# Patient Record
Sex: FEMALE | Race: WHITE | NOT HISPANIC OR LATINO | Employment: OTHER | URBAN - METROPOLITAN AREA
[De-identification: names, ages, dates, MRNs, and addresses within clinical notes are randomized per-mention and may not be internally consistent; named-entity substitution may affect disease eponyms.]

---

## 2017-03-15 ENCOUNTER — TRANSCRIBE ORDERS (OUTPATIENT)
Dept: ADMINISTRATIVE | Facility: HOSPITAL | Age: 67
End: 2017-03-15

## 2017-03-15 DIAGNOSIS — Z12.31 VISIT FOR SCREENING MAMMOGRAM: Primary | ICD-10-CM

## 2017-03-20 ENCOUNTER — APPOINTMENT (OUTPATIENT)
Dept: MAMMOGRAPHY | Facility: HOSPITAL | Age: 67
End: 2017-03-20

## 2017-09-08 ENCOUNTER — HOSPITAL ENCOUNTER (OUTPATIENT)
Dept: MAMMOGRAPHY | Facility: HOSPITAL | Age: 67
Discharge: HOME | End: 2017-09-08
Admitting: OBSTETRICS & GYNECOLOGY

## 2017-09-08 DIAGNOSIS — Z12.31 VISIT FOR SCREENING MAMMOGRAM: ICD-10-CM

## 2017-09-08 PROCEDURE — G0202 SCR MAMMO BI INCL CAD: HCPCS | Performed by: RADIOLOGY

## 2017-09-08 PROCEDURE — 77063 BREAST TOMOSYNTHESIS BI: CPT

## 2017-09-08 PROCEDURE — G0202 SCR MAMMO BI INCL CAD: HCPCS

## 2017-09-08 PROCEDURE — 77063 BREAST TOMOSYNTHESIS BI: CPT | Performed by: RADIOLOGY

## 2018-05-03 RX ORDER — RIZATRIPTAN BENZOATE 10 MG/1
10 TABLET, ORALLY DISINTEGRATING ORAL ONCE AS NEEDED
COMMUNITY

## 2018-05-03 RX ORDER — CELECOXIB 200 MG/1
200 CAPSULE ORAL DAILY
COMMUNITY
End: 2022-02-14 | Stop reason: HOSPADM

## 2018-05-03 RX ORDER — ONDANSETRON 4 MG/1
4 TABLET, FILM COATED ORAL EVERY 8 HOURS PRN
Status: ON HOLD | COMMUNITY
End: 2020-08-11

## 2018-05-03 RX ORDER — DIPHENHYDRAMINE HCL 25 MG
25 TABLET ORAL EVERY 6 HOURS PRN
COMMUNITY
End: 2018-05-15 | Stop reason: HOSPADM

## 2018-05-03 RX ORDER — HYDROCODONE BITARTRATE AND ACETAMINOPHEN 10; 325 MG/1; MG/1
1 TABLET ORAL EVERY 6 HOURS PRN
Status: ON HOLD | COMMUNITY
End: 2022-02-14 | Stop reason: SDUPTHER

## 2018-05-03 RX ORDER — TOPIRAMATE 100 MG/1
100 TABLET, FILM COATED ORAL 2 TIMES DAILY
COMMUNITY

## 2018-05-03 RX ORDER — MONTELUKAST SODIUM 4 MG/1
1 TABLET, CHEWABLE ORAL 2 TIMES DAILY
Status: ON HOLD | COMMUNITY
End: 2020-08-11

## 2018-05-03 RX ORDER — LIDOCAINE 50 MG/G
1 PATCH TOPICAL DAILY PRN
COMMUNITY

## 2018-05-04 ENCOUNTER — OFFICE VISIT (OUTPATIENT)
Dept: NEUROSURGERY | Facility: CLINIC | Age: 68
End: 2018-05-04

## 2018-05-04 VITALS — WEIGHT: 143 LBS | BODY MASS INDEX: 22.98 KG/M2 | HEIGHT: 66 IN | TEMPERATURE: 98.3 F

## 2018-05-04 DIAGNOSIS — M54.2 NECK PAIN: Primary | ICD-10-CM

## 2018-05-04 DIAGNOSIS — Z98.1 HISTORY OF FUSION OF CERVICAL SPINE: ICD-10-CM

## 2018-05-04 DIAGNOSIS — M54.12 CERVICAL RADICULOPATHY: ICD-10-CM

## 2018-05-04 PROCEDURE — 99203 OFFICE O/P NEW LOW 30 MIN: CPT | Performed by: NEUROLOGICAL SURGERY

## 2018-05-04 RX ORDER — TRAZODONE HYDROCHLORIDE 50 MG/1
50 TABLET ORAL NIGHTLY
COMMUNITY
End: 2022-02-11 | Stop reason: DRUGHIGH

## 2018-05-04 RX ORDER — BACLOFEN 10 MG/1
10 TABLET ORAL 3 TIMES DAILY PRN
COMMUNITY
Start: 2018-03-27 | End: 2018-05-04

## 2018-05-04 NOTE — PROGRESS NOTES
Patient: Camila Mayberry  : 1950    Primary Care Provider: Maya Kay MD    Requesting Provider: As above      History    Chief Complaint: Left upper extremity pain with sensory alteration.    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  On 5/15/2009 she underwent ACDF at C6-7 to address her left upper extremity radiculopathy.  She also has a chronic regional pain syndrome that afflicts her left foot.  She is followed by Dr. Verduzco in the pain clinic.  She does have a [employer]tronic spinal cord stimulator in place that was placed about 5 years ago.  Over the last 6 months she developed increasing neck pain that extends down the left arm into the middle finger of the left hand.  She has some associated sensory alteration.  She's had increased headache.  Physical therapy has actually aggravated her symptoms.  She has no right upper extremity symptoms.  She denies bowel or bladder dysfunction.  She's better lying flat.  She is worse when using her arm.    Review of Systems   Constitutional: Positive for activity change, appetite change, chills and fatigue. Negative for diaphoresis, fever and unexpected weight change.   HENT: Positive for postnasal drip and sinus pressure. Negative for congestion, dental problem, drooling, ear discharge, ear pain, facial swelling, hearing loss, mouth sores, nosebleeds, rhinorrhea, sneezing, sore throat, tinnitus, trouble swallowing and voice change.    Eyes: Negative for photophobia, pain, discharge, redness, itching and visual disturbance.   Respiratory: Negative for apnea, cough, choking, chest tightness, shortness of breath, wheezing and stridor.    Cardiovascular: Negative for chest pain, palpitations and leg swelling.   Gastrointestinal: Positive for abdominal pain and nausea. Negative for abdominal distention, anal bleeding, blood in stool, constipation, diarrhea, rectal pain and vomiting.   Endocrine: Positive for  cold intolerance. Negative for heat intolerance, polydipsia, polyphagia and polyuria.   Genitourinary: Negative for decreased urine volume, difficulty urinating, dysuria, enuresis, flank pain, frequency, genital sores, hematuria and urgency.   Musculoskeletal: Positive for arthralgias, myalgias, neck pain and neck stiffness. Negative for back pain, gait problem and joint swelling.   Skin: Negative for color change, pallor, rash and wound.   Allergic/Immunologic: Negative for environmental allergies, food allergies and immunocompromised state.   Neurological: Positive for headaches. Negative for dizziness, tremors, seizures, syncope, facial asymmetry, speech difficulty, weakness, light-headedness and numbness.   Hematological: Negative for adenopathy. Does not bruise/bleed easily.   Psychiatric/Behavioral: Negative for agitation, behavioral problems, confusion, decreased concentration, dysphoric mood, hallucinations, self-injury, sleep disturbance and suicidal ideas. The patient is not nervous/anxious and is not hyperactive.        The patient's past medical history, past surgical history, family history, and social history have been reviewed at length in the electronic medical record.    Physical Exam:   There were no vitals taken for this visit.  CONSTITUTIONAL: Patient is well-nourished, pleasant and appears stated age.  CV: Heart regular rate and rhythm without murmur, rub, or gallop.  PULMONARY: Lungs are clear to ascultation.  MUSCULOSKELETAL:  Neck tenderness to palpation is not observed.   ROM in neck is normal.  Well-healed low right anterior cervical incision is noted.  Ranging the left shoulder induces some trapezius region pain.  Tinel signs are negative at the wrists.  NEUROLOGICAL:  Orientation, memory, attention span, language function, and cognition have been examined and are intact.  Strength is intact in the upper and lower extremities to direct testing.  Muscle tone is normal throughout.  Station  and gait are normal.  Sensation is intact to light touch testing throughout.  Deep tendon reflexes are 1+ and symmetrical.  Jessica's Sign is negative bilaterally. No clonus is elicited.  Coordination is intact.      Medical Decision Making      Diagnosis:   Cervical radiculopathy.    Treatment Options:   I suspect that her 5-year-old Medtronic epidural spinal cord stimulator is not MRI compatible.  I'm going to set up a cervical CT myelogram to better delineate her disease.      No diagnosis found.    Scribed for Stephon Arriola MD by Zuleyma Mendoza CMA on 05/04/2018 at 9:15 AM    I, Dr. Arriola, personally performed the services described in the documentation, as scribed in my presence, and it is both accurate and complete.

## 2018-05-08 ENCOUNTER — HOSPITAL ENCOUNTER (OUTPATIENT)
Dept: GENERAL RADIOLOGY | Facility: HOSPITAL | Age: 68
Discharge: HOME | End: 2018-05-08
Attending: NEUROLOGICAL SURGERY | Admitting: NEUROLOGICAL SURGERY

## 2018-05-08 ENCOUNTER — HOSPITAL ENCOUNTER (OUTPATIENT)
Dept: GENERAL RADIOLOGY | Facility: HOSPITAL | Age: 68
Discharge: HOME | End: 2018-05-08

## 2018-05-08 VITALS
SYSTOLIC BLOOD PRESSURE: 108 MMHG | WEIGHT: 143 LBS | OXYGEN SATURATION: 96 % | RESPIRATION RATE: 18 BRPM | TEMPERATURE: 98 F | BODY MASS INDEX: 22.98 KG/M2 | HEART RATE: 59 BPM | HEIGHT: 66 IN | DIASTOLIC BLOOD PRESSURE: 62 MMHG

## 2018-05-08 VITALS — HEART RATE: 60 BPM | SYSTOLIC BLOOD PRESSURE: 100 MMHG | RESPIRATION RATE: 18 BRPM | DIASTOLIC BLOOD PRESSURE: 56 MMHG

## 2018-05-08 DIAGNOSIS — M54.12 CERVICAL RADICULOPATHY: ICD-10-CM

## 2018-05-08 DIAGNOSIS — M54.2 NECK PAIN: Primary | ICD-10-CM

## 2018-05-08 PROCEDURE — 72240 MYELOGRAPHY NECK SPINE: CPT

## 2018-05-08 PROCEDURE — 25010000002 IOPAMIDOL 61 % SOLUTION: Performed by: NEUROLOGICAL SURGERY

## 2018-05-08 RX ORDER — DIPHENHYDRAMINE HCL 25 MG
50 CAPSULE ORAL TAKE AS DIRECTED
Qty: 2 CAPSULE | Refills: 0 | Status: SHIPPED | OUTPATIENT
Start: 2018-05-08 | End: 2018-05-15 | Stop reason: HOSPADM

## 2018-05-08 RX ORDER — PROMETHAZINE HYDROCHLORIDE 25 MG/ML
25 INJECTION, SOLUTION INTRAMUSCULAR; INTRAVENOUS ONCE AS NEEDED
Status: DISCONTINUED | OUTPATIENT
Start: 2018-05-08 | End: 2018-05-09 | Stop reason: HOSPADM

## 2018-05-08 RX ORDER — ONDANSETRON 4 MG/1
4 TABLET, FILM COATED ORAL ONCE AS NEEDED
Status: DISCONTINUED | OUTPATIENT
Start: 2018-05-08 | End: 2018-05-09 | Stop reason: HOSPADM

## 2018-05-08 RX ORDER — LIDOCAINE HYDROCHLORIDE 10 MG/ML
5 INJECTION, SOLUTION INFILTRATION; PERINEURAL ONCE
Status: DISCONTINUED | OUTPATIENT
Start: 2018-05-08 | End: 2018-05-08

## 2018-05-08 RX ORDER — DEXAMETHASONE 4 MG/1
8 TABLET ORAL TAKE AS DIRECTED
Qty: 2 TABLET | Refills: 0 | Status: SHIPPED | OUTPATIENT
Start: 2018-05-08 | End: 2018-05-15 | Stop reason: HOSPADM

## 2018-05-08 RX ORDER — ACETAMINOPHEN 500 MG
500 TABLET ORAL EVERY 4 HOURS PRN
Status: DISCONTINUED | OUTPATIENT
Start: 2018-05-08 | End: 2018-05-09 | Stop reason: HOSPADM

## 2018-05-08 RX ORDER — PROMETHAZINE HYDROCHLORIDE 25 MG/1
25 TABLET ORAL ONCE AS NEEDED
Status: DISCONTINUED | OUTPATIENT
Start: 2018-05-08 | End: 2018-05-09 | Stop reason: HOSPADM

## 2018-05-08 RX ADMIN — IOPAMIDOL 15 ML: 612 INJECTION, SOLUTION INTRATHECAL at 07:14

## 2018-05-08 RX ADMIN — LIDOCAINE HYDROCHLORIDE 5 ML: 10 INJECTION, SOLUTION INFILTRATION; PERINEURAL at 07:14

## 2018-05-08 NOTE — POST-PROCEDURE NOTE
MYELOGRAM PROCEDURE NOTE  Neurosurgery    Patient: Camila Mayberry  : 1950      PreOp Diagnosis: Cervical radiculopathy    PostOp Diagnosis: Same    Procedure: Cervical myelogram    Surgeon: Flako    Anesthesia: 1% lidocaine    Technique:   Spinal needle: 22 gauge   Contrast: Isovue 300 (15ml)   Injection site: L4    EBL: Trace    Specimens removed: None    Complication: None    Contrast is extra-arachnoid.  Will hold on CT and repeat study on another day.    Stephon Arriola MD  18  7:27 AM

## 2018-05-08 NOTE — NURSING NOTE
Pt discharged to home s/p attempted myelogram.  Contrast was unable to reach the cervical area.  Discharge instructions reviewed with patient and spouse.  Both verbalize understanding.  Pt transported to exit via wheelchair per CNA.

## 2018-05-15 ENCOUNTER — HOSPITAL ENCOUNTER (OUTPATIENT)
Dept: CT IMAGING | Facility: HOSPITAL | Age: 68
Discharge: HOME | End: 2018-05-15

## 2018-05-15 ENCOUNTER — HOSPITAL ENCOUNTER (OUTPATIENT)
Dept: GENERAL RADIOLOGY | Facility: HOSPITAL | Age: 68
Discharge: HOME | End: 2018-05-15
Attending: NEUROLOGICAL SURGERY | Admitting: NEUROLOGICAL SURGERY

## 2018-05-15 VITALS
DIASTOLIC BLOOD PRESSURE: 55 MMHG | HEIGHT: 66 IN | OXYGEN SATURATION: 96 % | HEART RATE: 73 BPM | RESPIRATION RATE: 18 BRPM | BODY MASS INDEX: 23.01 KG/M2 | SYSTOLIC BLOOD PRESSURE: 106 MMHG | TEMPERATURE: 98.3 F | WEIGHT: 143.2 LBS

## 2018-05-15 DIAGNOSIS — M54.12 CERVICAL RADICULOPATHY: ICD-10-CM

## 2018-05-15 DIAGNOSIS — M54.2 NECK PAIN: ICD-10-CM

## 2018-05-15 PROCEDURE — 72240 MYELOGRAPHY NECK SPINE: CPT

## 2018-05-15 PROCEDURE — 72126 CT NECK SPINE W/DYE: CPT

## 2018-05-15 PROCEDURE — 25010000002 IOPAMIDOL 61 % SOLUTION: Performed by: NEUROLOGICAL SURGERY

## 2018-05-15 RX ORDER — LIDOCAINE HYDROCHLORIDE 10 MG/ML
5 INJECTION, SOLUTION INFILTRATION; PERINEURAL ONCE
Status: COMPLETED | OUTPATIENT
Start: 2018-05-15 | End: 2018-05-15

## 2018-05-15 RX ORDER — ONDANSETRON 4 MG/1
4 TABLET, FILM COATED ORAL ONCE AS NEEDED
Status: DISCONTINUED | OUTPATIENT
Start: 2018-05-15 | End: 2018-05-16 | Stop reason: HOSPADM

## 2018-05-15 RX ORDER — ACETAMINOPHEN 500 MG
500 TABLET ORAL EVERY 4 HOURS PRN
Status: DISCONTINUED | OUTPATIENT
Start: 2018-05-15 | End: 2018-05-16 | Stop reason: HOSPADM

## 2018-05-15 RX ORDER — PROMETHAZINE HYDROCHLORIDE 25 MG/1
25 TABLET ORAL ONCE AS NEEDED
Status: DISCONTINUED | OUTPATIENT
Start: 2018-05-15 | End: 2018-05-16 | Stop reason: HOSPADM

## 2018-05-15 RX ORDER — PROMETHAZINE HYDROCHLORIDE 25 MG/ML
25 INJECTION, SOLUTION INTRAMUSCULAR; INTRAVENOUS ONCE AS NEEDED
Status: DISCONTINUED | OUTPATIENT
Start: 2018-05-15 | End: 2018-05-16 | Stop reason: HOSPADM

## 2018-05-15 RX ADMIN — IOPAMIDOL 15 ML: 612 INJECTION, SOLUTION INTRATHECAL at 07:05

## 2018-05-15 RX ADMIN — LIDOCAINE HYDROCHLORIDE 5 ML: 10 INJECTION, SOLUTION INFILTRATION; PERINEURAL at 07:05

## 2018-05-15 NOTE — POST-PROCEDURE NOTE
MYELOGRAM PROCEDURE NOTE  Neurosurgery    Patient: Camila Mayberry  : 1950      PreOp Diagnosis: Cervical radiculpathy    PostOp Diagnosis: Same    Procedure: Cervical myelogram    Surgeon: Flako    Anesthesia: 1% lidocaine    Technique:   Spinal needle: 22 gauge   Contrast:  Isovue 300 (15ml)   Injection site: Midline L5-S1    EBL: Trace    Specimens removed: None    Complication: None        Stephon Arriola MD  05/15/18  7:08 AM

## 2018-05-16 ENCOUNTER — TELEPHONE (OUTPATIENT)
Dept: INTERVENTIONAL RADIOLOGY/VASCULAR | Facility: HOSPITAL | Age: 68
End: 2018-05-16

## 2018-05-16 NOTE — TELEPHONE ENCOUNTER
@[id number])@    Other Comments:

## 2018-05-18 ENCOUNTER — OFFICE VISIT (OUTPATIENT)
Dept: NEUROSURGERY | Facility: CLINIC | Age: 68
End: 2018-05-18

## 2018-05-18 VITALS — WEIGHT: 142 LBS | HEIGHT: 66 IN | BODY MASS INDEX: 22.82 KG/M2 | TEMPERATURE: 97.5 F

## 2018-05-18 DIAGNOSIS — M47.22 CERVICAL SPONDYLOSIS WITH RADICULOPATHY: Primary | ICD-10-CM

## 2018-05-18 DIAGNOSIS — Z98.1 HISTORY OF FUSION OF CERVICAL SPINE: ICD-10-CM

## 2018-05-18 DIAGNOSIS — M50.30 BULGING OF CERVICAL INTERVERTEBRAL DISC: ICD-10-CM

## 2018-05-18 PROCEDURE — 99213 OFFICE O/P EST LOW 20 MIN: CPT | Performed by: NEUROLOGICAL SURGERY

## 2018-05-18 NOTE — PROGRESS NOTES
Patient: Camila Mayberry  : 1950    Primary Care Provider: Maya Kay MD    Requesting Provider: As above        History    Chief Complaint: Left upper extremity pain and sensory alteration.    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  On 5/15/2009 she underwent ACDF at C6-7 to address her left upper extremity radiculopathy.  She also has a chronic regional pain syndrome that afflicts her left foot.  She is followed by Dr. Verduzco in the pain clinic.  She does have a Medtronic spinal cord stimulator in place that was placed about 5 years ago.  Over the last 6 months she developed increasing neck pain that extends down the left arm into the middle finger of the left hand.  She has some associated sensory alteration.  She's had increased headache.  Physical therapy has actually aggravated her symptoms.  She has no right upper extremity symptoms.  She denies bowel or bladder dysfunction.  She's better lying flat.  She is worse when using her arm.    Review of Systems   Constitutional: Positive for activity change, appetite change, chills and fatigue. Negative for diaphoresis, fever and unexpected weight change.   HENT: Positive for postnasal drip and sinus pressure. Negative for congestion, dental problem, drooling, ear discharge, ear pain, facial swelling, hearing loss, mouth sores, nosebleeds, rhinorrhea, sneezing, sore throat, tinnitus, trouble swallowing and voice change.    Eyes: Negative for photophobia, pain, discharge, redness, itching and visual disturbance.   Respiratory: Negative for apnea, cough, choking, chest tightness, shortness of breath, wheezing and stridor.    Cardiovascular: Negative for chest pain, palpitations and leg swelling.   Gastrointestinal: Positive for abdominal pain and nausea. Negative for abdominal distention, anal bleeding, blood in stool, constipation, diarrhea, rectal pain and vomiting.   Endocrine: Positive for  "cold intolerance. Negative for heat intolerance, polydipsia, polyphagia and polyuria.   Genitourinary: Negative for decreased urine volume, difficulty urinating, dysuria, enuresis, flank pain, frequency, genital sores, hematuria and urgency.   Musculoskeletal: Positive for arthralgias, myalgias, neck pain and neck stiffness. Negative for back pain, gait problem and joint swelling.   Skin: Negative for color change, pallor, rash and wound.   Allergic/Immunologic: Negative for environmental allergies, food allergies and immunocompromised state.   Neurological: Positive for headaches. Negative for dizziness, tremors, seizures, syncope, facial asymmetry, speech difficulty, weakness, light-headedness and numbness.   Hematological: Negative for adenopathy. Does not bruise/bleed easily.   Psychiatric/Behavioral: Negative for agitation, behavioral problems, confusion, decreased concentration, dysphoric mood, hallucinations, self-injury, sleep disturbance and suicidal ideas. The patient is not nervous/anxious and is not hyperactive.        The patient's past medical history, past surgical history, family history, and social history have been reviewed at length in the electronic medical record.    Physical Exam:   Temp 97.5 °F (36.4 °C)   Ht 167.6 cm (66\")   Wt 64.4 kg (142 lb)   BMI 22.92 kg/m²   MUSCULOSKELETAL:  Neck tenderness to palpation is not observed.   ROM in neck is normal.  NEUROLOGICAL:  Strength is intact in the upper and lower extremities to direct testing.  Muscle tone is normal throughout.  Station and gait are normal.  Sensation is intact to light touch testing throughout.    Medical Decision Making    Data Review:   CT myelogram demonstrates solid fusion at C6-C7.  This is uninstrumented.  There is disc osteophyte leftward that significantly narrows the recess at C5-6.    Diagnosis:   Left C6 radiculopathy secondary to disc herniation and spondylosis.    Treatment Options:   I have discussed treatment " options with the patient.  She has already been doing therapy extensively.  She would like to avoid surgical intervention if possible.  I'm going to refer her back to Dr. Verduzco for some selective epidural injections on the left at C5-6.  She will follow up with me thereafter.  If she continues to struggle then we'll need to consider C5-6 ACDF.       Diagnosis Plan   1. Cervical spondylosis with radiculopathy     2. Bulging of cervical intervertebral disc     3. History of fusion of cervical spine         Scribed for Stephon Arriola MD by Zuleyma Mendoza CMA on 05/18/2018 at 7:54 AM    I, Dr. Arriola, personally performed the services described in the documentation, as scribed in my presence, and it is both accurate and complete.

## 2019-04-23 ENCOUNTER — TRANSCRIBE ORDERS (OUTPATIENT)
Dept: ADMINISTRATIVE | Facility: HOSPITAL | Age: 69
End: 2019-04-23

## 2019-04-23 DIAGNOSIS — Z12.31 VISIT FOR SCREENING MAMMOGRAM: Primary | ICD-10-CM

## 2019-05-31 ENCOUNTER — APPOINTMENT (OUTPATIENT)
Dept: MAMMOGRAPHY | Facility: HOSPITAL | Age: 69
End: 2019-05-31

## 2019-07-02 ENCOUNTER — HOSPITAL ENCOUNTER (OUTPATIENT)
Dept: MAMMOGRAPHY | Facility: HOSPITAL | Age: 69
Discharge: HOME | End: 2019-07-02
Admitting: OBSTETRICS & GYNECOLOGY

## 2019-07-02 DIAGNOSIS — Z12.31 VISIT FOR SCREENING MAMMOGRAM: ICD-10-CM

## 2019-07-02 PROCEDURE — 77063 BREAST TOMOSYNTHESIS BI: CPT | Performed by: RADIOLOGY

## 2019-07-02 PROCEDURE — 77063 BREAST TOMOSYNTHESIS BI: CPT

## 2019-07-02 PROCEDURE — 77067 SCR MAMMO BI INCL CAD: CPT | Performed by: RADIOLOGY

## 2019-07-02 PROCEDURE — 77067 SCR MAMMO BI INCL CAD: CPT

## 2020-08-07 ENCOUNTER — TRANSCRIBE ORDERS (OUTPATIENT)
Dept: CARDIOLOGY | Facility: CLINIC | Age: 70
End: 2020-08-07

## 2020-08-07 DIAGNOSIS — R94.39 ABNORMAL STRESS TEST: Primary | ICD-10-CM

## 2020-08-09 ENCOUNTER — APPOINTMENT (OUTPATIENT)
Dept: PREADMISSION TESTING | Facility: HOSPITAL | Age: 70
End: 2020-08-09

## 2020-08-09 PROCEDURE — U0002 COVID-19 LAB TEST NON-CDC: HCPCS

## 2020-08-09 PROCEDURE — C9803 HOPD COVID-19 SPEC COLLECT: HCPCS

## 2020-08-09 PROCEDURE — U0004 COV-19 TEST NON-CDC HGH THRU: HCPCS

## 2020-08-10 LAB
REF LAB TEST METHOD: NORMAL
SARS-COV-2 RNA RESP QL NAA+PROBE: NOT DETECTED

## 2020-08-11 ENCOUNTER — HOSPITAL ENCOUNTER (OUTPATIENT)
Facility: HOSPITAL | Age: 70
Setting detail: HOSPITAL OUTPATIENT SURGERY
Discharge: HOME | End: 2020-08-11
Attending: INTERNAL MEDICINE | Admitting: INTERNAL MEDICINE

## 2020-08-11 VITALS
HEART RATE: 70 BPM | OXYGEN SATURATION: 95 % | HEIGHT: 66 IN | TEMPERATURE: 98.8 F | SYSTOLIC BLOOD PRESSURE: 128 MMHG | BODY MASS INDEX: 25.26 KG/M2 | WEIGHT: 157.19 LBS | RESPIRATION RATE: 16 BRPM | DIASTOLIC BLOOD PRESSURE: 69 MMHG

## 2020-08-11 DIAGNOSIS — R94.39 ABNORMAL STRESS TEST: ICD-10-CM

## 2020-08-11 LAB
ALBUMIN SERPL-MCNC: 4.3 G/DL (ref 3.5–5.2)
ALBUMIN/GLOB SERPL: 1.4 G/DL
ALP SERPL-CCNC: 74 U/L (ref 39–117)
ALT SERPL W P-5'-P-CCNC: 13 U/L (ref 1–33)
ANION GAP SERPL CALCULATED.3IONS-SCNC: 11 MMOL/L (ref 5–15)
AST SERPL-CCNC: 19 U/L (ref 1–32)
BILIRUB SERPL-MCNC: 0.2 MG/DL (ref 0–1.2)
BUN SERPL-MCNC: 6 MG/DL (ref 8–23)
BUN/CREAT SERPL: 6.8 (ref 7–25)
CALCIUM SPEC-SCNC: 9.5 MG/DL (ref 8.6–10.5)
CHLORIDE SERPL-SCNC: 106 MMOL/L (ref 98–107)
CHOLEST SERPL-MCNC: 233 MG/DL (ref 0–200)
CO2 SERPL-SCNC: 21 MMOL/L (ref 22–29)
CREAT SERPL-MCNC: 0.88 MG/DL (ref 0.57–1)
DEPRECATED RDW RBC AUTO: 42.8 FL (ref 37–54)
ERYTHROCYTE [DISTWIDTH] IN BLOOD BY AUTOMATED COUNT: 12.6 % (ref 12.3–15.4)
GFR SERPL CREATININE-BSD FRML MDRD: 64 ML/MIN/1.73
GLOBULIN UR ELPH-MCNC: 3.1 GM/DL
GLUCOSE SERPL-MCNC: 97 MG/DL (ref 65–99)
HBA1C MFR BLD: 5.5 % (ref 4.8–5.6)
HCT VFR BLD AUTO: 41 % (ref 34–46.6)
HDLC SERPL-MCNC: 70 MG/DL (ref 40–60)
HGB BLD-MCNC: 13 G/DL (ref 12–15.9)
LDLC SERPL CALC-MCNC: 119 MG/DL (ref 0–100)
LDLC/HDLC SERPL: 1.7 {RATIO}
MCH RBC QN AUTO: 29.4 PG (ref 26.6–33)
MCHC RBC AUTO-ENTMCNC: 31.7 G/DL (ref 31.5–35.7)
MCV RBC AUTO: 92.8 FL (ref 79–97)
PLATELET # BLD AUTO: 192 10*3/MM3 (ref 140–450)
PMV BLD AUTO: 9.3 FL (ref 6–12)
POTASSIUM SERPL-SCNC: 3.5 MMOL/L (ref 3.5–5.2)
PROT SERPL-MCNC: 7.4 G/DL (ref 6–8.5)
RBC # BLD AUTO: 4.42 10*6/MM3 (ref 3.77–5.28)
SODIUM SERPL-SCNC: 138 MMOL/L (ref 136–145)
TRIGL SERPL-MCNC: 219 MG/DL (ref 0–150)
VLDLC SERPL-MCNC: 43.8 MG/DL
WBC # BLD AUTO: 4.7 10*3/MM3 (ref 3.4–10.8)

## 2020-08-11 PROCEDURE — 82565 ASSAY OF CREATININE: CPT

## 2020-08-11 PROCEDURE — 25010000002 MIDAZOLAM PER 1 MG: Performed by: INTERNAL MEDICINE

## 2020-08-11 PROCEDURE — 0 IOPAMIDOL PER 1 ML: Performed by: INTERNAL MEDICINE

## 2020-08-11 PROCEDURE — 36415 COLL VENOUS BLD VENIPUNCTURE: CPT

## 2020-08-11 PROCEDURE — 93458 L HRT ARTERY/VENTRICLE ANGIO: CPT | Performed by: INTERNAL MEDICINE

## 2020-08-11 PROCEDURE — 83036 HEMOGLOBIN GLYCOSYLATED A1C: CPT | Performed by: NURSE PRACTITIONER

## 2020-08-11 PROCEDURE — 80053 COMPREHEN METABOLIC PANEL: CPT | Performed by: NURSE PRACTITIONER

## 2020-08-11 PROCEDURE — C1769 GUIDE WIRE: HCPCS | Performed by: INTERNAL MEDICINE

## 2020-08-11 PROCEDURE — C1894 INTRO/SHEATH, NON-LASER: HCPCS | Performed by: INTERNAL MEDICINE

## 2020-08-11 PROCEDURE — 85027 COMPLETE CBC AUTOMATED: CPT | Performed by: NURSE PRACTITIONER

## 2020-08-11 PROCEDURE — C1760 CLOSURE DEV, VASC: HCPCS | Performed by: INTERNAL MEDICINE

## 2020-08-11 PROCEDURE — 80061 LIPID PANEL: CPT | Performed by: NURSE PRACTITIONER

## 2020-08-11 PROCEDURE — S0260 H&P FOR SURGERY: HCPCS | Performed by: NURSE PRACTITIONER

## 2020-08-11 PROCEDURE — 25010000003 LIDOCAINE 1 % SOLUTION: Performed by: INTERNAL MEDICINE

## 2020-08-11 PROCEDURE — 25010000002 FENTANYL CITRATE (PF) 100 MCG/2ML SOLUTION: Performed by: INTERNAL MEDICINE

## 2020-08-11 RX ORDER — SODIUM CHLORIDE 9 MG/ML
1-3 INJECTION, SOLUTION INTRAVENOUS CONTINUOUS
Status: DISCONTINUED | OUTPATIENT
Start: 2020-08-11 | End: 2020-08-11 | Stop reason: HOSPADM

## 2020-08-11 RX ORDER — LIDOCAINE HYDROCHLORIDE 10 MG/ML
INJECTION, SOLUTION INFILTRATION; PERINEURAL AS NEEDED
Status: DISCONTINUED | OUTPATIENT
Start: 2020-08-11 | End: 2020-08-11 | Stop reason: HOSPADM

## 2020-08-11 RX ORDER — DIPHENHYDRAMINE HCL 25 MG
25 CAPSULE ORAL EVERY 6 HOURS PRN
COMMUNITY

## 2020-08-11 RX ORDER — ASPIRIN 325 MG
325 TABLET, DELAYED RELEASE (ENTERIC COATED) ORAL DAILY
Status: DISCONTINUED | OUTPATIENT
Start: 2020-08-12 | End: 2020-08-11 | Stop reason: HOSPADM

## 2020-08-11 RX ORDER — ASPIRIN 325 MG
325 TABLET ORAL ONCE
Status: COMPLETED | OUTPATIENT
Start: 2020-08-11 | End: 2020-08-11

## 2020-08-11 RX ORDER — ACETAMINOPHEN 325 MG/1
650 TABLET ORAL EVERY 4 HOURS PRN
Status: DISCONTINUED | OUTPATIENT
Start: 2020-08-11 | End: 2020-08-11 | Stop reason: HOSPADM

## 2020-08-11 RX ORDER — SODIUM CHLORIDE 0.9 % (FLUSH) 0.9 %
3 SYRINGE (ML) INJECTION EVERY 12 HOURS SCHEDULED
Status: DISCONTINUED | OUTPATIENT
Start: 2020-08-11 | End: 2020-08-11 | Stop reason: HOSPADM

## 2020-08-11 RX ORDER — NITROGLYCERIN 0.4 MG/1
0.4 TABLET SUBLINGUAL
Status: DISCONTINUED | OUTPATIENT
Start: 2020-08-11 | End: 2020-08-11 | Stop reason: HOSPADM

## 2020-08-11 RX ORDER — MIDAZOLAM HYDROCHLORIDE 1 MG/ML
INJECTION INTRAMUSCULAR; INTRAVENOUS AS NEEDED
Status: DISCONTINUED | OUTPATIENT
Start: 2020-08-11 | End: 2020-08-11 | Stop reason: HOSPADM

## 2020-08-11 RX ORDER — ONDANSETRON 2 MG/ML
4 INJECTION INTRAMUSCULAR; INTRAVENOUS EVERY 6 HOURS PRN
Status: DISCONTINUED | OUTPATIENT
Start: 2020-08-11 | End: 2020-08-11 | Stop reason: HOSPADM

## 2020-08-11 RX ORDER — SODIUM CHLORIDE 0.9 % (FLUSH) 0.9 %
10 SYRINGE (ML) INJECTION AS NEEDED
Status: DISCONTINUED | OUTPATIENT
Start: 2020-08-11 | End: 2020-08-11 | Stop reason: HOSPADM

## 2020-08-11 RX ORDER — FENTANYL CITRATE 50 UG/ML
INJECTION, SOLUTION INTRAMUSCULAR; INTRAVENOUS AS NEEDED
Status: DISCONTINUED | OUTPATIENT
Start: 2020-08-11 | End: 2020-08-11 | Stop reason: HOSPADM

## 2020-08-11 RX ADMIN — SODIUM CHLORIDE 3 ML/KG/HR: 9 INJECTION, SOLUTION INTRAVENOUS at 10:50

## 2020-08-11 RX ADMIN — ASPIRIN 325 MG ORAL TABLET 325 MG: 325 PILL ORAL at 10:50

## 2020-08-14 LAB — CREAT BLDA-MCNC: 0.2 MG/DL (ref 0.6–1.3)

## 2020-08-27 ENCOUNTER — TRANSCRIBE ORDERS (OUTPATIENT)
Dept: ADMINISTRATIVE | Facility: HOSPITAL | Age: 70
End: 2020-08-27

## 2020-08-27 DIAGNOSIS — Z12.31 ENCOUNTER FOR SCREENING MAMMOGRAM FOR MALIGNANT NEOPLASM OF BREAST: Primary | ICD-10-CM

## 2020-09-09 ENCOUNTER — HOSPITAL ENCOUNTER (OUTPATIENT)
Dept: MAMMOGRAPHY | Facility: HOSPITAL | Age: 70
Discharge: HOME | End: 2020-09-09
Admitting: OBSTETRICS & GYNECOLOGY

## 2020-09-09 DIAGNOSIS — Z12.31 ENCOUNTER FOR SCREENING MAMMOGRAM FOR MALIGNANT NEOPLASM OF BREAST: ICD-10-CM

## 2020-09-09 PROCEDURE — 77063 BREAST TOMOSYNTHESIS BI: CPT | Performed by: RADIOLOGY

## 2020-09-09 PROCEDURE — 77063 BREAST TOMOSYNTHESIS BI: CPT

## 2020-09-09 PROCEDURE — 77067 SCR MAMMO BI INCL CAD: CPT | Performed by: RADIOLOGY

## 2020-09-09 PROCEDURE — 77067 SCR MAMMO BI INCL CAD: CPT

## 2020-10-22 ENCOUNTER — HOSPITAL ENCOUNTER (OUTPATIENT)
Age: 70
End: 2020-10-22
Payer: MEDICARE

## 2020-10-22 DIAGNOSIS — Z03.818: Primary | ICD-10-CM

## 2020-10-22 PROCEDURE — U0003 INFECTIOUS AGENT DETECTION BY NUCLEIC ACID (DNA OR RNA); SEVERE ACUTE RESPIRATORY SYNDROME CORONAVIRUS 2 (SARS-COV-2) (CORONAVIRUS DISEASE [COVID-19]), AMPLIFIED PROBE TECHNIQUE, MAKING USE OF HIGH THROUGHPUT TECHNOLOGIES AS DESCRIBED BY CMS-2020-01-R: HCPCS

## 2021-10-01 ENCOUNTER — TELEPHONE (OUTPATIENT)
Dept: NEUROLOGY | Facility: OTHER | Age: 71
End: 2021-10-01

## 2021-10-01 NOTE — TELEPHONE ENCOUNTER
PT CALLED AND STATES THAT SHE IS GOING TO CHECK WITH PAIN MANAGEMENT TO GET A NEW REFERRAL-PT STATES THAT SHE HAS HAD SURGERY IN THE PAST BY -LAST SEEN IN MAY 2018-PT STATES THAT SHE NEEDS TO COME BACK TO SEE US AS SHE IS HAVING SYMPTOMS AGAIN

## 2022-01-19 ENCOUNTER — DOCUMENTATION (OUTPATIENT)
Dept: NEUROSURGERY | Facility: CLINIC | Age: 72
End: 2022-01-19

## 2022-01-19 ENCOUNTER — OFFICE VISIT (OUTPATIENT)
Dept: NEUROSURGERY | Facility: CLINIC | Age: 72
End: 2022-01-19

## 2022-01-19 VITALS — HEIGHT: 66 IN | TEMPERATURE: 98.2 F | BODY MASS INDEX: 25.68 KG/M2 | WEIGHT: 159.8 LBS

## 2022-01-19 DIAGNOSIS — Z00.6 EXAM FOR CLINICAL RESEARCH: Primary | ICD-10-CM

## 2022-01-19 DIAGNOSIS — M50.30 DEGENERATION OF CERVICAL INTERVERTEBRAL DISC: Primary | ICD-10-CM

## 2022-01-19 DIAGNOSIS — M47.812 CERVICAL SPONDYLOSIS WITHOUT MYELOPATHY: ICD-10-CM

## 2022-01-19 PROCEDURE — 99204 OFFICE O/P NEW MOD 45 MIN: CPT | Performed by: PHYSICIAN ASSISTANT

## 2022-01-19 RX ORDER — ATOGEPANT 60 MG/1
60 TABLET ORAL DAILY
COMMUNITY
End: 2022-10-12

## 2022-01-19 NOTE — PROGRESS NOTES
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  On 5/15/2009 she underwent ACDF at C6/7 to address her left upper extremity radiculopathy.  She also has a history of chronic regional pain syndrome that flex her left foot.  She underwent Medtronic spinal cord stimulator placement about 8 years ago.  She was last seen by Dr. Arriola in 2018 with the above complaints.  A CT myelogram demonstrated disc osteophyte leftward at C5-6 that significantly narrows the recess.  At the time her symptoms were not debilitating.  She opted to continue with conservative therapies as long as possible.  She has been treated with injections by Dr. Reynoso.  She attends physical therapy regularly.  She feels like she has reached a plateau.  Her pain begins in the neck and radiates to the left shoulder and upper arm.  She feels tightness in the forearm all the way into the middle finger.  Lately she has been experiencing some weird sensations in the ulnar fingers. At times she has some pain into the right shoulder, but it is predominately left sided. Symptoms are particularly aggravating when driving.         Past Medical History:   Diagnosis Date   • Anxiety    • Arthritis    • Asthma    • Back problem    • CRPS (complex regional pain syndrome), lower limb    • Diabetes mellitus (HCC)    • GERD (gastroesophageal reflux disease)    • H/O drug dependence (HCC)    • Headache    • Osteoporosis          Current Outpatient Medications:   •  albuterol (2.5 MG/3ML) 0.083% nebulizer solution 3 mL, albuterol (5 MG/ML) 0.5% nebulizer solution 0.5 mL, Inhale., Disp: , Rfl:   •  Atogepant (Qulipta) 60 MG tablet, Take 60 mg by mouth Daily. For Migraines, Disp: , Rfl:   •  celecoxib (CeleBREX) 200 MG capsule,  Take 200 mg by mouth Daily., Disp: , Rfl:   •  diphenhydrAMINE (BENADRYL) 25 mg capsule, Take 25 mg by mouth Every 6 (Six) Hours As Needed for Itching., Disp: , Rfl:   •  HYDROcodone-acetaminophen (NORCO)  MG per tablet, Take 1 tablet by mouth Every 6 (Six) Hours As Needed for Moderate Pain ., Disp: , Rfl:   •  lidocaine (LIDODERM) 5 %, Place 1 patch on the skin Daily. Remove & Discard patch within 12 hours or as directed by MD, Disp: , Rfl:   •  rizatriptan MLT (MAXALT-MLT) 10 MG disintegrating tablet, Take 10 mg by mouth 1 (One) Time As Needed for Migraine. May repeat in 2 hours if needed, Disp: , Rfl:   •  topiramate (TOPAMAX) 100 MG tablet, Take 100 mg by mouth 2 (Two) Times a Day., Disp: , Rfl:   •  traZODone (DESYREL) 50 MG tablet, Take 50 mg by mouth Every Night., Disp: , Rfl:   •  Unable to find, 1 each 1 (One) Time. States lotrinex, Disp: , Rfl:     Past Surgical History:   Procedure Laterality Date   • ANTERIOR CERVICAL DISCECTOMY W/ FUSION  05/15/2009    ACDF C6/7 (Dr. Arriola)   • APPENDECTOMY     • CARDIAC CATHETERIZATION N/A 8/11/2020    Procedure: Left Heart Cath;  Surgeon: Tim Quiroz MD;  Location: [facility] INVASIVE LOCATION;  Service: Cardiovascular;  Laterality: N/A;   • CATARACT EXTRACTION  1996/1998   • CHOLECYSTECTOMY     • FOOT SURGERY  2009   • HYSTERECTOMY      AGE 37   • SPINAL CORD STIMULATOR IMPLANT  2013    [employer]tronic (Dr. Barr, [place])       Social History     Socioeconomic History   • Marital status:    Tobacco Use   • Smoking status: Never Smoker   • Smokeless tobacco: Never Used   Vaping Use   • Vaping Use: Never used   Substance and Sexual Activity   • Alcohol use: No   • Drug use: No   • Sexual activity: Defer         Review of Systems   Constitutional: Negative for activity change, appetite change, chills and fever.   HENT: Negative for facial swelling, hearing loss, tinnitus, trouble swallowing and voice change.    Eyes: Negative for pain and visual  "disturbance.   Respiratory: Negative for apnea and shortness of breath.    Cardiovascular: Negative for leg swelling.   Gastrointestinal: Negative for constipation, nausea and vomiting.   Genitourinary: Negative for difficulty urinating and dysuria.   Musculoskeletal: Negative for back pain, gait problem, joint swelling, neck pain and neck stiffness.   Skin: Negative for color change.   Neurological: Negative for dizziness, facial asymmetry, speech difficulty, weakness and numbness.   Psychiatric/Behavioral: Negative for behavioral problems, confusion, dysphoric mood and sleep disturbance. The patient is not nervous/anxious.        Objective   Vital Signs: Temperature 98.2 °F (36.8 °C), height 167.6 cm (65.98\"), weight 72.5 kg (159 lb 12.8 oz), not currently breastfeeding.  Physical Exam  Vitals and nursing note reviewed.   Constitutional:       General: She is not in acute distress.     Appearance: She is well-developed.   HENT:      Head: Normocephalic and atraumatic.   Eyes:      Pupils: Pupils are equal, round, and reactive to light.   Cardiovascular:      Heart sounds: Normal heart sounds.   Pulmonary:      Breath sounds: Normal breath sounds.   Psychiatric:         Behavior: Behavior normal.         Thought Content: Thought content normal.     Musculoskeletal:     Strength is intact in upper and lower extremities to direct testing.     Station and gait are normal.     Straight leg raising is negative.   Neurologic:     Muscle tone is normal throughout.     Coordination is intact.     Sensation is intact to light touch throughout.     Patient is oriented to person, place, and time.     Rocha sign negative.        Independent review of radiographic imaging: Previous CT myelogram from 2018 demonstrates solid fusion at R1-1-[id number].  There is disc osteophyte leftward that narrows the recess at C5-6    Assessment/Plan   Diagnosis: Cervical spondylosis with left upper extremity radiculopathy    Medical " Decision Making: I have referred patient for a CT myelogram of the cervical spine for surgical planning.  She cannot have an MRI due to her spinal cord stimulator.  She has tried epidural injections as well as physical therapy extensively and that is no longer providing the relief she is seeking.  I think she is most afflicted by spondylosis at C5-6 but she is having some new symptoms that could be emanating a little further down. She will follow up with Dr Arriola for review and further recommendations will be made at that time.           Diagnoses and all orders for this visit:    1. Degeneration of cervical intervertebral disc (Primary)  -     IR Myelogram Cervical Spine; Future  -     CT Cervical Spine With Intrathecal Contrast; Future    2. Cervical spondylosis without myelopathy  -     IR Myelogram Cervical Spine; Future  -     CT Cervical Spine With Intrathecal Contrast; Future                        Patient's Body mass index is 25.8 kg/m². indicating that she is overweight (BMI 25-29.9). Obesity-related health conditions include the following: diabetes mellitus and GERD. Obesity is unchanged. BMI is is above average; BMI management plan is completed. We discussed portion control and increasing exercise..         Alix Cortez PA-C  Patient Care Team:  Travis Linton MD as PCP - General (Family Medicine)  Thor Verduzco DO as Referring Physician (Anesthesiology)  Mckinley Gilliland MD as Consulting Physician (Gastroenterology)

## 2022-01-19 NOTE — PROGRESS NOTES
Loaded patient's cervical WO contrast CT into epic.     Gave disc back to patient during same day office visit.

## 2022-01-28 ENCOUNTER — TELEPHONE (OUTPATIENT)
Dept: NEUROSURGERY | Facility: CLINIC | Age: 72
End: 2022-01-28

## 2022-01-28 NOTE — TELEPHONE ENCOUNTER
Provider:  Flako  Caller: patient  Time of call:   1:57  Phone #: [phone]   Surgery:  na  Surgery Date:  na  Last visit:   1-  Next visit: jeremy ASHLEY:         Reason for call:  Patient called and wanted to know if she had to have a Covid test prior to her Myleo gram on Tuesday. Please Advise. Thank you.

## 2022-02-01 ENCOUNTER — HOSPITAL ENCOUNTER (OUTPATIENT)
Dept: GENERAL RADIOLOGY | Facility: HOSPITAL | Age: 72
Discharge: HOME | End: 2022-02-01

## 2022-02-01 ENCOUNTER — HOSPITAL ENCOUNTER (OUTPATIENT)
Dept: CT IMAGING | Facility: HOSPITAL | Age: 72
Discharge: HOME | End: 2022-02-01

## 2022-02-01 VITALS
BODY MASS INDEX: 26.46 KG/M2 | OXYGEN SATURATION: 96 % | RESPIRATION RATE: 16 BRPM | WEIGHT: 158.8 LBS | TEMPERATURE: 97.8 F | HEIGHT: 65 IN | SYSTOLIC BLOOD PRESSURE: 123 MMHG | HEART RATE: 79 BPM | DIASTOLIC BLOOD PRESSURE: 68 MMHG

## 2022-02-01 DIAGNOSIS — M47.812 CERVICAL SPONDYLOSIS WITHOUT MYELOPATHY: ICD-10-CM

## 2022-02-01 DIAGNOSIS — M50.30 DEGENERATION OF CERVICAL INTERVERTEBRAL DISC: ICD-10-CM

## 2022-02-01 PROCEDURE — 72126 CT NECK SPINE W/DYE: CPT

## 2022-02-01 PROCEDURE — 62302 MYELOGRAPHY LUMBAR INJECTION: CPT

## 2022-02-01 PROCEDURE — 72240 MYELOGRAPHY NECK SPINE: CPT

## 2022-02-01 PROCEDURE — 62284 INJECTION FOR MYELOGRAM: CPT | Performed by: NEUROLOGICAL SURGERY

## 2022-02-01 PROCEDURE — 25010000002 IOPAMIDOL 61 % SOLUTION: Performed by: NEUROLOGICAL SURGERY

## 2022-02-01 RX ORDER — ONDANSETRON 4 MG/1
4 TABLET, FILM COATED ORAL ONCE AS NEEDED
Status: CANCELLED | OUTPATIENT
Start: 2022-02-01

## 2022-02-01 RX ORDER — PROMETHAZINE HYDROCHLORIDE 25 MG/1
25 TABLET ORAL ONCE AS NEEDED
Status: CANCELLED | OUTPATIENT
Start: 2022-02-01

## 2022-02-01 RX ORDER — LIDOCAINE HYDROCHLORIDE 10 MG/ML
5 INJECTION, SOLUTION EPIDURAL; INFILTRATION; INTRACAUDAL; PERINEURAL ONCE
Status: COMPLETED | OUTPATIENT
Start: 2022-02-01 | End: 2022-02-01

## 2022-02-01 RX ADMIN — IOPAMIDOL 15 ML: 612 INJECTION, SOLUTION INTRATHECAL at 07:07

## 2022-02-01 RX ADMIN — LIDOCAINE HYDROCHLORIDE 5 ML: 10 INJECTION, SOLUTION EPIDURAL; INFILTRATION; INTRACAUDAL; PERINEURAL at 07:05

## 2022-02-01 NOTE — POST-PROCEDURE NOTE
MYELOGRAM PROCEDURE NOTE  Neurosurgery    Patient: Camila Mayberry  : 1950      PreOp Diagnosis: Cervical radiculopathy    PostOp Diagnosis: Same    Procedure: Cervical myelogram    Surgeon: Flako    Anesthesia: 1% lidocaine    Technique:   Spinal needle: 22 gauge    Contrast:  Isovue 300 (15ml)   Injection site: L4    EBL: Trace    Specimens removed: None    Complication: None        Stephon Arriola MD  22  07:06 EST

## 2022-02-02 ENCOUNTER — TELEPHONE (OUTPATIENT)
Dept: INFUSION THERAPY | Facility: HOSPITAL | Age: 72
End: 2022-02-02

## 2022-02-09 ENCOUNTER — TELEPHONE (OUTPATIENT)
Dept: NEUROSURGERY | Facility: CLINIC | Age: 72
End: 2022-02-09

## 2022-02-09 NOTE — TELEPHONE ENCOUNTER
Caller: Camila Mayberry    Relationship to patient: Self    Best call back number: [phone]    Chief complaint: APPT. FOR AFTER MYELOGRAM    Type of visit: FOLLOW UP AFTER MYELOGRAM    Requested date: ASAP    If rescheduling, when is the original appointment: NA    Additional notes:PT CALLED AND STATES THAT AFTER HER MYELOGRAM SHE WAS TOLD THAT SHE WOULD GET A CALL BACK FOR AN APPT.-PT STATES THAT SHE HAS NOT RECEIVED A CALL-PT IS WANTING TO MAKE SURE SHE DID NOT MISS ANYTHING-PLEASE CALL PT FOR SCHEDULING THANK YOU

## 2022-02-11 ENCOUNTER — PREP FOR SURGERY (OUTPATIENT)
Dept: OTHER | Facility: HOSPITAL | Age: 72
End: 2022-02-11

## 2022-02-11 ENCOUNTER — PRE-ADMISSION TESTING (OUTPATIENT)
Dept: PREADMISSION TESTING | Facility: HOSPITAL | Age: 72
End: 2022-02-11

## 2022-02-11 ENCOUNTER — OFFICE VISIT (OUTPATIENT)
Dept: NEUROSURGERY | Facility: CLINIC | Age: 72
End: 2022-02-11

## 2022-02-11 VITALS — BODY MASS INDEX: 26.12 KG/M2 | TEMPERATURE: 98.2 F | HEIGHT: 65 IN | WEIGHT: 156.8 LBS

## 2022-02-11 VITALS — WEIGHT: 157.08 LBS | HEIGHT: 65 IN | BODY MASS INDEX: 26.17 KG/M2

## 2022-02-11 DIAGNOSIS — M47.22 CERVICAL SPONDYLOSIS WITH RADICULOPATHY: Primary | ICD-10-CM

## 2022-02-11 DIAGNOSIS — M47.22 CERVICAL SPONDYLOSIS WITH RADICULOPATHY: ICD-10-CM

## 2022-02-11 DIAGNOSIS — M50.30 DDD (DEGENERATIVE DISC DISEASE), CERVICAL: ICD-10-CM

## 2022-02-11 LAB
DEPRECATED RDW RBC AUTO: 42.1 FL (ref 37–54)
ERYTHROCYTE [DISTWIDTH] IN BLOOD BY AUTOMATED COUNT: 12.6 % (ref 12.3–15.4)
HBA1C MFR BLD: 5.9 % (ref 4.8–5.6)
HCT VFR BLD AUTO: 39.3 % (ref 34–46.6)
HGB BLD-MCNC: 12.7 G/DL (ref 12–15.9)
MCH RBC QN AUTO: 29.5 PG (ref 26.6–33)
MCHC RBC AUTO-ENTMCNC: 32.3 G/DL (ref 31.5–35.7)
MCV RBC AUTO: 91.2 FL (ref 79–97)
MRSA DNA SPEC QL NAA+PROBE: NEGATIVE
PLATELET # BLD AUTO: 201 10*3/MM3 (ref 140–450)
PMV BLD AUTO: 9.5 FL (ref 6–12)
RBC # BLD AUTO: 4.31 10*6/MM3 (ref 3.77–5.28)
SARS-COV-2 RNA PNL SPEC NAA+PROBE: NOT DETECTED
WBC NRBC COR # BLD: 6.72 10*3/MM3 (ref 3.4–10.8)

## 2022-02-11 PROCEDURE — C9803 HOPD COVID-19 SPEC COLLECT: HCPCS

## 2022-02-11 PROCEDURE — 87641 MR-STAPH DNA AMP PROBE: CPT

## 2022-02-11 PROCEDURE — 93005 ELECTROCARDIOGRAM TRACING: CPT

## 2022-02-11 PROCEDURE — U0004 COV-19 TEST NON-CDC HGH THRU: HCPCS

## 2022-02-11 PROCEDURE — 93010 ELECTROCARDIOGRAM REPORT: CPT | Performed by: INTERNAL MEDICINE

## 2022-02-11 PROCEDURE — 83036 HEMOGLOBIN GLYCOSYLATED A1C: CPT

## 2022-02-11 PROCEDURE — 36415 COLL VENOUS BLD VENIPUNCTURE: CPT

## 2022-02-11 PROCEDURE — 85027 COMPLETE CBC AUTOMATED: CPT

## 2022-02-11 PROCEDURE — 99214 OFFICE O/P EST MOD 30 MIN: CPT | Performed by: NEUROLOGICAL SURGERY

## 2022-02-11 PROCEDURE — U0005 INFEC AGEN DETEC AMPLI PROBE: HCPCS

## 2022-02-11 RX ORDER — CHLORHEXIDINE GLUCONATE 4 G/100ML
SOLUTION TOPICAL
Qty: 120 ML | Refills: 0 | Status: ON HOLD | OUTPATIENT
Start: 2022-02-11 | End: 2022-02-14

## 2022-02-11 RX ORDER — VANCOMYCIN HYDROCHLORIDE 1 G/200ML
15 INJECTION, SOLUTION INTRAVENOUS ONCE
Status: CANCELLED | OUTPATIENT
Start: 2022-02-11 | End: 2022-02-11

## 2022-02-11 RX ORDER — FAMOTIDINE 20 MG/1
20 TABLET, FILM COATED ORAL
Status: CANCELLED | OUTPATIENT
Start: 2022-02-11

## 2022-02-11 RX ORDER — TRAZODONE HYDROCHLORIDE 100 MG/1
100 TABLET ORAL
COMMUNITY
Start: 2022-02-01

## 2022-02-11 NOTE — H&P (VIEW-ONLY)
Patient: Camila Mayberry  : 1950     Primary Care Provider: Travis Linton MD     Requesting Provider: As above           History     Chief Complaint: Neck and left upper extremity pain with sensory alteration.     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  On 5/15/2009 she underwent ACDF at C6/7 to address her left upper extremity radiculopathy.  She also has a history of chronic regional pain syndrome that flex her left foot.  She underwent Medtr[employer] spinal cord stimulator placement about 8 years ago.   A CT myelogram in 2018 demonstrated disc osteophyte leftward at C5-6 that significantly narrows the recess.  At the time her symptoms were not debilitating.  She opted to continue with conservative therapies as long as possible.  She has been treated with injections by Dr. Reynoso.  Injections have ceased to be helpful.  She attends physical therapy regularly.  She feels like she has reached a plateau.  Her pain begins in the neck and radiates to the left shoulder and upper arm.  She feels tightness in the forearm all the way into the middle finger.  Lately she has been experiencing some weird sensations in the ulnar fingers. At times she has some pain into the right shoulder, but it is predominately left sided.  She complains of some medial scapular pain on the left.  Symptoms are particularly aggravating when driving.      Review of Systems   Constitutional: Negative for activity change, appetite change, chills, diaphoresis, fatigue, fever and unexpected weight change.   HENT: Negative for congestion, dental problem, drooling, ear discharge, ear pain, facial swelling, hearing loss, mouth sores, nosebleeds, postnasal drip, rhinorrhea, sinus pressure, sinus pain, sneezing, sore throat, tinnitus, trouble swallowing and voice change.    Eyes: Negative for photophobia, pain, discharge, redness, itching and visual disturbance.   Respiratory:  Negative for apnea, cough, choking, chest tightness, shortness of breath, wheezing and stridor.    Cardiovascular: Negative for chest pain, palpitations and leg swelling.   Gastrointestinal: Positive for abdominal pain and nausea. Negative for abdominal distention, anal bleeding, blood in stool, constipation, diarrhea, rectal pain and vomiting.   Endocrine: Negative for cold intolerance, heat intolerance, polydipsia, polyphagia and polyuria.   Genitourinary: Negative for decreased urine volume, difficulty urinating, dysuria, enuresis, flank pain, frequency, genital sores, hematuria and urgency.   Musculoskeletal: Positive for arthralgias, joint swelling, neck pain and neck stiffness. Negative for back pain, gait problem and myalgias.   Skin: Negative for color change, pallor, rash and wound.   Allergic/Immunologic: Negative for environmental allergies, food allergies and immunocompromised state.   Neurological: Positive for headaches. Negative for dizziness, tremors, seizures, syncope, facial asymmetry, speech difficulty, weakness, light-headedness and numbness.   Hematological: Negative for adenopathy. Does not bruise/bleed easily.   Psychiatric/Behavioral: Negative for agitation, behavioral problems, confusion, decreased concentration, dysphoric mood, hallucinations, self-injury, sleep disturbance and suicidal ideas. The patient is not nervous/anxious and is not hyperactive.    All other systems reviewed and are negative.        The patient's past medical history, past surgical history, family history, and social history have been reviewed at length in the electronic medical record.     Past Medical History:   Diagnosis Date   • Anxiety    • Arthritis    • Asthma    • Back problem    • CRPS (complex regional pain syndrome), lower limb    • Diabetes mellitus (HCC)    • GERD (gastroesophageal reflux disease)    • H/O drug dependence (HCC)    • Headache    • Osteoporosis      Past Surgical History:   Procedure  Laterality Date   • ANTERIOR CERVICAL DISCECTOMY W/ FUSION  05/15/2009    ACDF C6/7 (Dr. Arriola)   • APPENDECTOMY     • CARDIAC CATHETERIZATION N/A 8/11/2020    Procedure: Left Heart Cath;  Surgeon: Tim Quiroz MD;  Location: [facility] CATH INVASIVE LOCATION;  Service: Cardiovascular;  Laterality: N/A;   • CATARACT EXTRACTION  1996/1998   • CHOLECYSTECTOMY     • FOOT SURGERY  2009   • HYSTERECTOMY      AGE 37   • SPINAL CORD STIMULATOR IMPLANT  2013    [employer] [facility]     Family History   Problem Relation Age of Onset   • Breast cancer Mother 59   • Cancer Father    • Hyperlipidemia Sister    • Hypertension Sister    • Breast cancer Sister 30   • Hypertension Brother    • Arthritis Brother    • Arthritis Son    • Hypertension Brother    • Diabetes Brother    • Heart attack Brother    • Arthritis Brother    • Ovarian cancer Neg Hx      Social History     Socioeconomic History   • Marital status:    Tobacco Use   • Smoking status: Never Smoker   • Smokeless tobacco: Never Used   Vaping Use   • Vaping Use: Never used   Substance and Sexual Activity   • Alcohol use: No   • Drug use: No   • Sexual activity: Defer       Allergies   Allergen Reactions   • Adhesive Tape Rash   • Codeine Nausea And Vomiting   • Desipramine Hcl Hives   • Erythromycin Nausea And Vomiting   • Imitrex [Sumatriptan] Hives   • Penicillins Nausea And Vomiting   • Silver Other (See Comments)   • Wound Dressing Adhesive Unknown (See Comments)       Current Outpatient Medications on File Prior to Visit   Medication Sig Dispense Refill   • albuterol (2.5 MG/3ML) 0.083% nebulizer solution 3 mL, albuterol (5 MG/ML) 0.5% nebulizer solution 0.5 mL Inhale.     • Atogepant (Qulipta) 60 MG tablet Take 60 mg by mouth Daily. For Migraines     • celecoxib (CeleBREX) 200 MG capsule Take 200 mg by mouth Daily.     • diphenhydrAMINE (BENADRYL) 25 mg capsule Take 25 mg by mouth Every 6 (Six) Hours As Needed for Itching.     •  "HYDROcodone-acetaminophen (NORCO)  MG per tablet Take 1 tablet by mouth Every 6 (Six) Hours As Needed for Moderate Pain .     • lidocaine (LIDODERM) 5 % Place 1 patch on the skin Daily. Remove & Discard patch within 12 hours or as directed by MD     • rizatriptan MLT (MAXALT-MLT) 10 MG disintegrating tablet Take 10 mg by mouth 1 (One) Time As Needed for Migraine. May repeat in 2 hours if needed     • topiramate (TOPAMAX) 100 MG tablet Take 100 mg by mouth 2 (Two) Times a Day.     • traZODone (DESYREL) 100 MG tablet Take 100 mg by mouth every night at bedtime.     • [DISCONTINUED] traZODone (DESYREL) 50 MG tablet Take 50 mg by mouth Every Night.       No current facility-administered medications on file prior to visit.        Physical Exam:   Temp 98.2 °F (36.8 °C)   Ht 165.1 cm (65\")   Wt 71.1 kg (156 lb 12.8 oz)   BMI 26.09 kg/m²   MUSCULOSKELETAL:  Neck tenderness to palpation is not observed.   ROM in the neck is normal.  Well-healed low right anterior cervical incision is noted.  NEUROLOGICAL:  Strength is intact in the upper and lower extremities to direct testing.  Muscle tone is normal throughout.  Station and gait are normal.  Sensation is intact to light touch testing throughout.  Deep tendon reflexes are 1+ and symmetrical.  Jessica's Sign is negative bilaterally.         Medical Decision Making     Data Review:   (All imaging studies were personally reviewed unless stated otherwise)  Cervical CT myelogram dated 2/1/2022 demonstrates significant spondylosis at C5-6 with bilateral root truncation much more so on the left.  Remnants of the prior Mystique plate are present at C6-7 where there is a solid fusion.     Diagnosis:   Cervical spondylosis with left upper extremity radiculopathy.     Treatment Options:   Ms. Mayberry is struggling and as such I have recommended ACDF at C5-6.  The nature of the procedure as well as the potential risks, complications, limitations, and alternatives to the " procedure were discussed at length with the patient and the patient has agreed to proceed with surgery.  The patient has a number of diffuse symptoms.  I think surgery will help substantially although it may not completely eradicate all of her symptoms.          Diagnosis Plan   1. Cervical spondylosis with radiculopathy      2. DDD (degenerative disc disease), cervical

## 2022-02-11 NOTE — PAT
An arrival time for procedure was not given during PAT visit. If patient had any questions or concerns about their arrival time, they were instructed to contact their surgeon/physician.  Additionally, if the patient referred to an arrival time that was acquired from their my chart account, patient was encouraged to verify that time with their surgeon/physician.  NO arrival times given in Pre Admission Testing Department.    Patient instructed to drink 20 ounces (or until full) of Gatorade and it needs to be completed 1 hour (for Main OR patients) or 2 hours (scheduled  section patients) before given arrival time for procedure (NO RED Gatorade)    Patient verbalized understanding.    Patient to apply Chlorhexadine wipes  to surgical area (as instructed) the night before procedure and the AM of procedure. Wipes provided.    Patient viewed general PAT education video as instructed in their preoperative information received from their surgeon.  Patient stated the general PAT education video was viewed in its entirety and survey completed.  Copies of PAT general education handouts (Incentive Spirometry, Meds to Beds Program, Patient Belongings, Pre-op skin preparation instructions, Blood Glucose testing, Visitor policy, Surgery FAQ, Code H) distributed to patient if not printed. Education related to the PAT pass and skin preparation for surgery (if applicable) completed in PAT as a reinforcement to PAT education video. Patient instructed to return PAT pass provided today as well as completed skin preparation sheet (if applicable) on the day of procedure.     Additionally if patient had not viewed video yet but intended to view it at home or in our waiting area, then referred them to the handout with QR code/link provided during PAT visit.  Instructed patient to complete survey after viewing the video in its entirety.  Encouraged patient/family to read PAT general education handouts thoroughly and notify PAT staff  with any questions or concerns. Patient verbalized understanding of all information and priority content.    It was noted during Pre Admission Testing that patient was wearing some form of fingernail polish (gel/regular) and/or acrylic/artificial nails.  Patient was told that polish and/or artificial nails must be removed for surgery.  If a patient had recent manicure, and would rather not remove polish or artificial nails. Then the minimum requirement is that the polish/artificial nails must be removed from the middle finger on each hand.  Patient verbalized understanding.    If patient was having surgery on an upper extremity, then the patient was instructed that fingernail polish/artificial fingernails must be removed for surgery.  NO EXCEPTIONS.  Patient verbalized understanding.    If patient was having surgery on a lower extremity, then the patient was instructed that toenail polish on both extremities must be removed for surgery.  NO EXCEPTIONS. Patient verbalized understanding.    Bactroban and Chlorhexidine Prescription prescribed by physician before PAT visit.  Verified with patient that medications were picked up from their pharmacy.  Written instructions given to patient during PAT visit.  Patient/family also instructed to complete skin prep checklist and return the checklist on the day of surgery to preoperative staff.  Patient/family verbalized understanding.    COVID test done in PAT.    EKG performed in PAT noted to have abnormal reading. Faxed EKG and heart cath report from 2020 to anesthesia. Called and spoke with Dr. Jameson, reviewed faxed information and patient history, per Dr. Jameson pt is cleared to proceed with surgery as planned without additional cardiac testing.

## 2022-02-11 NOTE — PROGRESS NOTES
Patient: Camila Mayberry  : 1950    Primary Care Provider: Travis Linton MD    Requesting Provider: As above        History    Chief Complaint: Neck and left upper extremity pain with sensory alteration.    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  On 5/15/2009 she underwent ACDF at C6/7 to address her left upper extremity radiculopathy.  She also has a history of chronic regional pain syndrome that flex her left foot.  She underwent [employer] spinal cord stimulator placement about 8 years ago.   A CT myelogram in 2018 demonstrated disc osteophyte leftward at C5-6 that significantly narrows the recess.  At the time her symptoms were not debilitating.  She opted to continue with conservative therapies as long as possible.  She has been treated with injections by Dr. Reynoso.  Injections have ceased to be helpful.  She attends physical therapy regularly.  She feels like she has reached a plateau.  Her pain begins in the neck and radiates to the left shoulder and upper arm.  She feels tightness in the forearm all the way into the middle finger.  Lately she has been experiencing some weird sensations in the ulnar fingers. At times she has some pain into the right shoulder, but it is predominately left sided.  She complains of some medial scapular pain on the left.  Symptoms are particularly aggravating when driving.     Review of Systems   Constitutional: Negative for activity change, appetite change, chills, diaphoresis, fatigue, fever and unexpected weight change.   HENT: Negative for congestion, dental problem, drooling, ear discharge, ear pain, facial swelling, hearing loss, mouth sores, nosebleeds, postnasal drip, rhinorrhea, sinus pressure, sinus pain, sneezing, sore throat, tinnitus, trouble swallowing and voice change.    Eyes: Negative for photophobia, pain, discharge, redness, itching and visual disturbance.   Respiratory: Negative  "for apnea, cough, choking, chest tightness, shortness of breath, wheezing and stridor.    Cardiovascular: Negative for chest pain, palpitations and leg swelling.   Gastrointestinal: Positive for abdominal pain and nausea. Negative for abdominal distention, anal bleeding, blood in stool, constipation, diarrhea, rectal pain and vomiting.   Endocrine: Negative for cold intolerance, heat intolerance, polydipsia, polyphagia and polyuria.   Genitourinary: Negative for decreased urine volume, difficulty urinating, dysuria, enuresis, flank pain, frequency, genital sores, hematuria and urgency.   Musculoskeletal: Positive for arthralgias, joint swelling, neck pain and neck stiffness. Negative for back pain, gait problem and myalgias.   Skin: Negative for color change, pallor, rash and wound.   Allergic/Immunologic: Negative for environmental allergies, food allergies and immunocompromised state.   Neurological: Positive for headaches. Negative for dizziness, tremors, seizures, syncope, facial asymmetry, speech difficulty, weakness, light-headedness and numbness.   Hematological: Negative for adenopathy. Does not bruise/bleed easily.   Psychiatric/Behavioral: Negative for agitation, behavioral problems, confusion, decreased concentration, dysphoric mood, hallucinations, self-injury, sleep disturbance and suicidal ideas. The patient is not nervous/anxious and is not hyperactive.    All other systems reviewed and are negative.      The patient's past medical history, past surgical history, family history, and social history have been reviewed at length in the electronic medical record.    Physical Exam:   Temp 98.2 °F (36.8 °C)   Ht 165.1 cm (65\")   Wt 71.1 kg (156 lb 12.8 oz)   BMI 26.09 kg/m²   MUSCULOSKELETAL:  Neck tenderness to palpation is not observed.   ROM in the neck is normal.  Well-healed low right anterior cervical incision is noted.  NEUROLOGICAL:  Strength is intact in the upper and lower extremities to direct " testing.  Muscle tone is normal throughout.  Station and gait are normal.  Sensation is intact to light touch testing throughout.  Deep tendon reflexes are 1+ and symmetrical.  Jessica's Sign is negative bilaterally.       Medical Decision Making    Data Review:   (All imaging studies were personally reviewed unless stated otherwise)  Cervical CT myelogram dated 2/1/2022 demonstrates significant spondylosis at C5-6 with bilateral root truncation much more so on the left.  Remnants of the prior Mystique plate are present at C6-7 where there is a solid fusion.    Diagnosis:   Cervical spondylosis with left upper extremity radiculopathy.    Treatment Options:   Ms. Mayberry is struggling and as such I have recommended ACDF at C5-6.  The nature of the procedure as well as the potential risks, complications, limitations, and alternatives to the procedure were discussed at length with the patient and the patient has agreed to proceed with surgery.  The patient has a number of diffuse symptoms.  I think surgery will help substantially although it may not completely eradicate all of her symptoms.       Diagnosis Plan   1. Cervical spondylosis with radiculopathy     2. DDD (degenerative disc disease), cervical         Scribed for Stephon Arriola MD by Liz Brown MIGUEL 2/11/2022 13:59 EST      I, Dr. Arriola, personally performed the services described in the documentation, as scribed in my presence, and it is both accurate and complete.

## 2022-02-11 NOTE — H&P
Patient: Camila Mayberry  : 1950     Primary Care Provider: Travis Linton MD     Requesting Provider: As above           History     Chief Complaint: Neck and left upper extremity pain with sensory alteration.     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  On 5/15/2009 she underwent ACDF at C6/7 to address her left upper extremity radiculopathy.  She also has a history of chronic regional pain syndrome that flex her left foot.  She underwent Medtr[employer] spinal cord stimulator placement about 8 years ago.   A CT myelogram in 2018 demonstrated disc osteophyte leftward at C5-6 that significantly narrows the recess.  At the time her symptoms were not debilitating.  She opted to continue with conservative therapies as long as possible.  She has been treated with injections by Dr. Reynoso.  Injections have ceased to be helpful.  She attends physical therapy regularly.  She feels like she has reached a plateau.  Her pain begins in the neck and radiates to the left shoulder and upper arm.  She feels tightness in the forearm all the way into the middle finger.  Lately she has been experiencing some weird sensations in the ulnar fingers. At times she has some pain into the right shoulder, but it is predominately left sided.  She complains of some medial scapular pain on the left.  Symptoms are particularly aggravating when driving.      Review of Systems   Constitutional: Negative for activity change, appetite change, chills, diaphoresis, fatigue, fever and unexpected weight change.   HENT: Negative for congestion, dental problem, drooling, ear discharge, ear pain, facial swelling, hearing loss, mouth sores, nosebleeds, postnasal drip, rhinorrhea, sinus pressure, sinus pain, sneezing, sore throat, tinnitus, trouble swallowing and voice change.    Eyes: Negative for photophobia, pain, discharge, redness, itching and visual disturbance.   Respiratory:  Negative for apnea, cough, choking, chest tightness, shortness of breath, wheezing and stridor.    Cardiovascular: Negative for chest pain, palpitations and leg swelling.   Gastrointestinal: Positive for abdominal pain and nausea. Negative for abdominal distention, anal bleeding, blood in stool, constipation, diarrhea, rectal pain and vomiting.   Endocrine: Negative for cold intolerance, heat intolerance, polydipsia, polyphagia and polyuria.   Genitourinary: Negative for decreased urine volume, difficulty urinating, dysuria, enuresis, flank pain, frequency, genital sores, hematuria and urgency.   Musculoskeletal: Positive for arthralgias, joint swelling, neck pain and neck stiffness. Negative for back pain, gait problem and myalgias.   Skin: Negative for color change, pallor, rash and wound.   Allergic/Immunologic: Negative for environmental allergies, food allergies and immunocompromised state.   Neurological: Positive for headaches. Negative for dizziness, tremors, seizures, syncope, facial asymmetry, speech difficulty, weakness, light-headedness and numbness.   Hematological: Negative for adenopathy. Does not bruise/bleed easily.   Psychiatric/Behavioral: Negative for agitation, behavioral problems, confusion, decreased concentration, dysphoric mood, hallucinations, self-injury, sleep disturbance and suicidal ideas. The patient is not nervous/anxious and is not hyperactive.    All other systems reviewed and are negative.        The patient's past medical history, past surgical history, family history, and social history have been reviewed at length in the electronic medical record.     Past Medical History:   Diagnosis Date   • Anxiety    • Arthritis    • Asthma    • Back problem    • CRPS (complex regional pain syndrome), lower limb    • Diabetes mellitus (HCC)    • GERD (gastroesophageal reflux disease)    • H/O drug dependence (HCC)    • Headache    • Osteoporosis      Past Surgical History:   Procedure  Laterality Date   • ANTERIOR CERVICAL DISCECTOMY W/ FUSION  05/15/2009    ACDF C6/7 (Dr. Arriola)   • APPENDECTOMY     • CARDIAC CATHETERIZATION N/A 8/11/2020    Procedure: Left Heart Cath;  Surgeon: Tim Quiroz MD;  Location: [facility] CATH INVASIVE LOCATION;  Service: Cardiovascular;  Laterality: N/A;   • CATARACT EXTRACTION  1996/1998   • CHOLECYSTECTOMY     • FOOT SURGERY  2009   • HYSTERECTOMY      AGE 37   • SPINAL CORD STIMULATOR IMPLANT  2013    [facility]     Family History   Problem Relation Age of Onset   • Breast cancer Mother 59   • Cancer Father    • Hyperlipidemia Sister    • Hypertension Sister    • Breast cancer Sister 30   • Hypertension Brother    • Arthritis Brother    • Arthritis Son    • Hypertension Brother    • Diabetes Brother    • Heart attack Brother    • Arthritis Brother    • Ovarian cancer Neg Hx      Social History     Socioeconomic History   • Marital status:    Tobacco Use   • Smoking status: Never Smoker   • Smokeless tobacco: Never Used   Vaping Use   • Vaping Use: Never used   Substance and Sexual Activity   • Alcohol use: No   • Drug use: No   • Sexual activity: Defer       Allergies   Allergen Reactions   • Adhesive Tape Rash   • Codeine Nausea And Vomiting   • Desipramine Hcl Hives   • Erythromycin Nausea And Vomiting   • Imitrex [Sumatriptan] Hives   • Penicillins Nausea And Vomiting   • Silver Other (See Comments)   • Wound Dressing Adhesive Unknown (See Comments)       Current Outpatient Medications on File Prior to Visit   Medication Sig Dispense Refill   • albuterol (2.5 MG/3ML) 0.083% nebulizer solution 3 mL, albuterol (5 MG/ML) 0.5% nebulizer solution 0.5 mL Inhale.     • Atogepant (Qulipta) 60 MG tablet Take 60 mg by mouth Daily. For Migraines     • celecoxib (CeleBREX) 200 MG capsule Take 200 mg by mouth Daily.     • diphenhydrAMINE (BENADRYL) 25 mg capsule Take 25 mg by mouth Every 6 (Six) Hours As Needed for Itching.     •  "HYDROcodone-acetaminophen (NORCO)  MG per tablet Take 1 tablet by mouth Every 6 (Six) Hours As Needed for Moderate Pain .     • lidocaine (LIDODERM) 5 % Place 1 patch on the skin Daily. Remove & Discard patch within 12 hours or as directed by MD     • rizatriptan MLT (MAXALT-MLT) 10 MG disintegrating tablet Take 10 mg by mouth 1 (One) Time As Needed for Migraine. May repeat in 2 hours if needed     • topiramate (TOPAMAX) 100 MG tablet Take 100 mg by mouth 2 (Two) Times a Day.     • traZODone (DESYREL) 100 MG tablet Take 100 mg by mouth every night at bedtime.     • [DISCONTINUED] traZODone (DESYREL) 50 MG tablet Take 50 mg by mouth Every Night.       No current facility-administered medications on file prior to visit.        Physical Exam:   Temp 98.2 °F (36.8 °C)   Ht 165.1 cm (65\")   Wt 71.1 kg (156 lb 12.8 oz)   BMI 26.09 kg/m²   MUSCULOSKELETAL:  Neck tenderness to palpation is not observed.   ROM in the neck is normal.  Well-healed low right anterior cervical incision is noted.  NEUROLOGICAL:  Strength is intact in the upper and lower extremities to direct testing.  Muscle tone is normal throughout.  Station and gait are normal.  Sensation is intact to light touch testing throughout.  Deep tendon reflexes are 1+ and symmetrical.  Jessica's Sign is negative bilaterally.         Medical Decision Making     Data Review:   (All imaging studies were personally reviewed unless stated otherwise)  Cervical CT myelogram dated 2/1/2022 demonstrates significant spondylosis at C5-6 with bilateral root truncation much more so on the left.  Remnants of the prior Mystique plate are present at C6-7 where there is a solid fusion.     Diagnosis:   Cervical spondylosis with left upper extremity radiculopathy.     Treatment Options:   Ms. Mayberry is struggling and as such I have recommended ACDF at C5-6.  The nature of the procedure as well as the potential risks, complications, limitations, and alternatives to the " procedure were discussed at length with the patient and the patient has agreed to proceed with surgery.  The patient has a number of diffuse symptoms.  I think surgery will help substantially although it may not completely eradicate all of her symptoms.          Diagnosis Plan   1. Cervical spondylosis with radiculopathy      2. DDD (degenerative disc disease), cervical

## 2022-02-14 ENCOUNTER — APPOINTMENT (OUTPATIENT)
Dept: GENERAL RADIOLOGY | Facility: HOSPITAL | Age: 72
End: 2022-02-14

## 2022-02-14 ENCOUNTER — HOSPITAL ENCOUNTER (OUTPATIENT)
Facility: HOSPITAL | Age: 72
Discharge: HOME | End: 2022-02-14
Attending: NEUROLOGICAL SURGERY | Admitting: NEUROLOGICAL SURGERY

## 2022-02-14 ENCOUNTER — ANESTHESIA EVENT (OUTPATIENT)
Dept: PERIOP | Facility: HOSPITAL | Age: 72
End: 2022-02-14

## 2022-02-14 ENCOUNTER — ANESTHESIA (OUTPATIENT)
Dept: PERIOP | Facility: HOSPITAL | Age: 72
End: 2022-02-14

## 2022-02-14 VITALS
OXYGEN SATURATION: 98 % | RESPIRATION RATE: 18 BRPM | WEIGHT: 157.08 LBS | TEMPERATURE: 98.7 F | HEIGHT: 65 IN | DIASTOLIC BLOOD PRESSURE: 72 MMHG | SYSTOLIC BLOOD PRESSURE: 151 MMHG | BODY MASS INDEX: 26.17 KG/M2 | HEART RATE: 80 BPM

## 2022-02-14 DIAGNOSIS — M47.22 CERVICAL SPONDYLOSIS WITH RADICULOPATHY: ICD-10-CM

## 2022-02-14 LAB
QT INTERVAL: 376 MS
QTC INTERVAL: 406 MS

## 2022-02-14 PROCEDURE — 76000 FLUOROSCOPY <1 HR PHYS/QHP: CPT

## 2022-02-14 PROCEDURE — C1713 ANCHOR/SCREW BN/BN,TIS/BN: HCPCS | Performed by: NEUROLOGICAL SURGERY

## 2022-02-14 PROCEDURE — 25010000002 FENTANYL CITRATE (PF) 50 MCG/ML SOLUTION: Performed by: NURSE ANESTHETIST, CERTIFIED REGISTERED

## 2022-02-14 PROCEDURE — L0174 CERV SR 2PC THOR EXT PRE OTS: HCPCS | Performed by: NEUROLOGICAL SURGERY

## 2022-02-14 PROCEDURE — C1889 IMPLANT/INSERT DEVICE, NOC: HCPCS | Performed by: NEUROLOGICAL SURGERY

## 2022-02-14 PROCEDURE — 25010000002 VANCOMYCIN PER 500 MG: Performed by: NEUROLOGICAL SURGERY

## 2022-02-14 PROCEDURE — 22551 ARTHRD ANT NTRBDY CERVICAL: CPT | Performed by: NEUROLOGICAL SURGERY

## 2022-02-14 PROCEDURE — 25010000002 ONDANSETRON PER 1 MG: Performed by: NURSE ANESTHETIST, CERTIFIED REGISTERED

## 2022-02-14 PROCEDURE — 25010000002 HYDROMORPHONE 1 MG/ML SOLUTION

## 2022-02-14 PROCEDURE — 25010000002 NEOSTIGMINE 10 MG/10ML SOLUTION: Performed by: NURSE ANESTHETIST, CERTIFIED REGISTERED

## 2022-02-14 PROCEDURE — 25010000002 FENTANYL CITRATE (PF) 50 MCG/ML SOLUTION

## 2022-02-14 PROCEDURE — 25010000002 DEXAMETHASONE: Performed by: NEUROLOGICAL SURGERY

## 2022-02-14 PROCEDURE — 25010000002 PROPOFOL 10 MG/ML EMULSION: Performed by: NURSE ANESTHETIST, CERTIFIED REGISTERED

## 2022-02-14 PROCEDURE — 20931 SP BONE ALGRFT STRUCT ADD-ON: CPT | Performed by: NEUROLOGICAL SURGERY

## 2022-02-14 PROCEDURE — 22845 INSERT SPINE FIXATION DEVICE: CPT | Performed by: NEUROLOGICAL SURGERY

## 2022-02-14 PROCEDURE — 22551 ARTHRD ANT NTRBDY CERVICAL: CPT | Performed by: PHYSICIAN ASSISTANT

## 2022-02-14 PROCEDURE — 22845 INSERT SPINE FIXATION DEVICE: CPT | Performed by: PHYSICIAN ASSISTANT

## 2022-02-14 DEVICE — PLATE 3001017 ZEVO 17MM 1 LVL
Type: IMPLANTABLE DEVICE | Site: SPINE CERVICAL | Status: FUNCTIONAL
Brand: ZEVO™ ANTERIOR CERVICAL PLATE SYSTEM

## 2022-02-14 DEVICE — HEMOST ABS SURGIFOAM SZ100 8X12 10MM: Type: IMPLANTABLE DEVICE | Site: SPINE CERVICAL | Status: FUNCTIONAL

## 2022-02-14 DEVICE — FLOSEAL HEMOSTATIC MATRIX, 10ML
Type: IMPLANTABLE DEVICE | Site: SPINE CERVICAL | Status: FUNCTIONAL
Brand: FLOSEAL HEMOSTATIC MATRIX

## 2022-02-14 DEVICE — BONE LORDOTIC ASR 6X14X11 FZD: Type: IMPLANTABLE DEVICE | Site: SPINE CERVICAL | Status: FUNCTIONAL

## 2022-02-14 RX ORDER — DROPERIDOL 2.5 MG/ML
0.62 INJECTION, SOLUTION INTRAMUSCULAR; INTRAVENOUS ONCE AS NEEDED
Status: DISCONTINUED | OUTPATIENT
Start: 2022-02-14 | End: 2022-02-14 | Stop reason: HOSPADM

## 2022-02-14 RX ORDER — SODIUM CHLORIDE 0.9 % (FLUSH) 0.9 %
10 SYRINGE (ML) INJECTION EVERY 12 HOURS SCHEDULED
Status: DISCONTINUED | OUTPATIENT
Start: 2022-02-14 | End: 2022-02-14 | Stop reason: HOSPADM

## 2022-02-14 RX ORDER — FENTANYL CITRATE 50 UG/ML
50 INJECTION, SOLUTION INTRAMUSCULAR; INTRAVENOUS
Status: DISCONTINUED | OUTPATIENT
Start: 2022-02-14 | End: 2022-02-14 | Stop reason: HOSPADM

## 2022-02-14 RX ORDER — FENTANYL CITRATE 50 UG/ML
INJECTION, SOLUTION INTRAMUSCULAR; INTRAVENOUS AS NEEDED
Status: DISCONTINUED | OUTPATIENT
Start: 2022-02-14 | End: 2022-02-14 | Stop reason: SURG

## 2022-02-14 RX ORDER — ONDANSETRON 2 MG/ML
INJECTION INTRAMUSCULAR; INTRAVENOUS AS NEEDED
Status: DISCONTINUED | OUTPATIENT
Start: 2022-02-14 | End: 2022-02-14 | Stop reason: SURG

## 2022-02-14 RX ORDER — ROCURONIUM BROMIDE 10 MG/ML
INJECTION, SOLUTION INTRAVENOUS AS NEEDED
Status: DISCONTINUED | OUTPATIENT
Start: 2022-02-14 | End: 2022-02-14 | Stop reason: SURG

## 2022-02-14 RX ORDER — LABETALOL HYDROCHLORIDE 5 MG/ML
5 INJECTION, SOLUTION INTRAVENOUS
Status: DISCONTINUED | OUTPATIENT
Start: 2022-02-14 | End: 2022-02-14 | Stop reason: HOSPADM

## 2022-02-14 RX ORDER — NALOXONE HCL 0.4 MG/ML
0.4 VIAL (ML) INJECTION AS NEEDED
Status: DISCONTINUED | OUTPATIENT
Start: 2022-02-14 | End: 2022-02-14 | Stop reason: HOSPADM

## 2022-02-14 RX ORDER — FENTANYL CITRATE 50 UG/ML
INJECTION, SOLUTION INTRAMUSCULAR; INTRAVENOUS
Status: COMPLETED
Start: 2022-02-14 | End: 2022-02-14

## 2022-02-14 RX ORDER — MAGNESIUM HYDROXIDE 1200 MG/15ML
LIQUID ORAL AS NEEDED
Status: DISCONTINUED | OUTPATIENT
Start: 2022-02-14 | End: 2022-02-14 | Stop reason: HOSPADM

## 2022-02-14 RX ORDER — SODIUM CHLORIDE 0.9 % (FLUSH) 0.9 %
3 SYRINGE (ML) INJECTION EVERY 12 HOURS SCHEDULED
Status: DISCONTINUED | OUTPATIENT
Start: 2022-02-14 | End: 2022-02-14 | Stop reason: HOSPADM

## 2022-02-14 RX ORDER — PROMETHAZINE HYDROCHLORIDE 25 MG/1
25 TABLET ORAL ONCE AS NEEDED
Status: DISCONTINUED | OUTPATIENT
Start: 2022-02-14 | End: 2022-02-14 | Stop reason: HOSPADM

## 2022-02-14 RX ORDER — IPRATROPIUM BROMIDE AND ALBUTEROL SULFATE 2.5; .5 MG/3ML; MG/3ML
3 SOLUTION RESPIRATORY (INHALATION) ONCE AS NEEDED
Status: DISCONTINUED | OUTPATIENT
Start: 2022-02-14 | End: 2022-02-14 | Stop reason: HOSPADM

## 2022-02-14 RX ORDER — GLYCOPYRROLATE 0.2 MG/ML
INJECTION INTRAMUSCULAR; INTRAVENOUS AS NEEDED
Status: DISCONTINUED | OUTPATIENT
Start: 2022-02-14 | End: 2022-02-14 | Stop reason: SURG

## 2022-02-14 RX ORDER — HYDROCODONE BITARTRATE AND ACETAMINOPHEN 5; 325 MG/1; MG/1
1 TABLET ORAL ONCE AS NEEDED
Status: DISCONTINUED | OUTPATIENT
Start: 2022-02-14 | End: 2022-02-14 | Stop reason: HOSPADM

## 2022-02-14 RX ORDER — MIDAZOLAM HYDROCHLORIDE 1 MG/ML
0.5 INJECTION INTRAMUSCULAR; INTRAVENOUS
Status: DISCONTINUED | OUTPATIENT
Start: 2022-02-14 | End: 2022-02-14 | Stop reason: HOSPADM

## 2022-02-14 RX ORDER — HYDROCODONE BITARTRATE AND ACETAMINOPHEN 10; 325 MG/1; MG/1
1 TABLET ORAL EVERY 6 HOURS PRN
Qty: 25 TABLET | Refills: 0 | Status: SHIPPED | OUTPATIENT
Start: 2022-02-14

## 2022-02-14 RX ORDER — LIDOCAINE HYDROCHLORIDE 10 MG/ML
INJECTION, SOLUTION EPIDURAL; INFILTRATION; INTRACAUDAL; PERINEURAL AS NEEDED
Status: DISCONTINUED | OUTPATIENT
Start: 2022-02-14 | End: 2022-02-14 | Stop reason: SURG

## 2022-02-14 RX ORDER — SODIUM CHLORIDE 0.9 % (FLUSH) 0.9 %
3-10 SYRINGE (ML) INJECTION AS NEEDED
Status: DISCONTINUED | OUTPATIENT
Start: 2022-02-14 | End: 2022-02-14 | Stop reason: HOSPADM

## 2022-02-14 RX ORDER — MEPERIDINE HYDROCHLORIDE 25 MG/ML
12.5 INJECTION INTRAMUSCULAR; INTRAVENOUS; SUBCUTANEOUS
Status: DISCONTINUED | OUTPATIENT
Start: 2022-02-14 | End: 2022-02-14 | Stop reason: HOSPADM

## 2022-02-14 RX ORDER — PROMETHAZINE HYDROCHLORIDE 25 MG/1
25 SUPPOSITORY RECTAL ONCE AS NEEDED
Status: DISCONTINUED | OUTPATIENT
Start: 2022-02-14 | End: 2022-02-14 | Stop reason: HOSPADM

## 2022-02-14 RX ORDER — DROPERIDOL 2.5 MG/ML
0.62 INJECTION, SOLUTION INTRAMUSCULAR; INTRAVENOUS AS NEEDED
Status: DISCONTINUED | OUTPATIENT
Start: 2022-02-14 | End: 2022-02-14 | Stop reason: HOSPADM

## 2022-02-14 RX ORDER — SODIUM CHLORIDE, SODIUM LACTATE, POTASSIUM CHLORIDE, CALCIUM CHLORIDE 600; 310; 30; 20 MG/100ML; MG/100ML; MG/100ML; MG/100ML
9 INJECTION, SOLUTION INTRAVENOUS CONTINUOUS PRN
Status: DISCONTINUED | OUTPATIENT
Start: 2022-02-14 | End: 2022-02-14 | Stop reason: HOSPADM

## 2022-02-14 RX ORDER — ESMOLOL HYDROCHLORIDE 10 MG/ML
INJECTION INTRAVENOUS AS NEEDED
Status: DISCONTINUED | OUTPATIENT
Start: 2022-02-14 | End: 2022-02-14 | Stop reason: SURG

## 2022-02-14 RX ORDER — FAMOTIDINE 20 MG/1
20 TABLET, FILM COATED ORAL
Status: CANCELLED | OUTPATIENT
Start: 2022-02-14

## 2022-02-14 RX ORDER — VANCOMYCIN HYDROCHLORIDE 1 G/200ML
15 INJECTION, SOLUTION INTRAVENOUS ONCE
Status: COMPLETED | OUTPATIENT
Start: 2022-02-14 | End: 2022-02-14

## 2022-02-14 RX ORDER — SODIUM CHLORIDE 0.9 % (FLUSH) 0.9 %
10 SYRINGE (ML) INJECTION AS NEEDED
Status: DISCONTINUED | OUTPATIENT
Start: 2022-02-14 | End: 2022-02-14 | Stop reason: HOSPADM

## 2022-02-14 RX ORDER — HYDROMORPHONE HYDROCHLORIDE 1 MG/ML
0.5 INJECTION, SOLUTION INTRAMUSCULAR; INTRAVENOUS; SUBCUTANEOUS
Status: DISCONTINUED | OUTPATIENT
Start: 2022-02-14 | End: 2022-02-14 | Stop reason: HOSPADM

## 2022-02-14 RX ORDER — SODIUM CHLORIDE 9 MG/ML
INJECTION, SOLUTION INTRAVENOUS AS NEEDED
Status: DISCONTINUED | OUTPATIENT
Start: 2022-02-14 | End: 2022-02-14 | Stop reason: HOSPADM

## 2022-02-14 RX ORDER — BUPIVACAINE HCL/0.9 % NACL/PF 0.125 %
PLASTIC BAG, INJECTION (ML) EPIDURAL AS NEEDED
Status: DISCONTINUED | OUTPATIENT
Start: 2022-02-14 | End: 2022-02-14 | Stop reason: SURG

## 2022-02-14 RX ORDER — FAMOTIDINE 20 MG/1
20 TABLET, FILM COATED ORAL
Status: COMPLETED | OUTPATIENT
Start: 2022-02-14 | End: 2022-02-14

## 2022-02-14 RX ORDER — HYDRALAZINE HYDROCHLORIDE 20 MG/ML
5 INJECTION INTRAMUSCULAR; INTRAVENOUS
Status: DISCONTINUED | OUTPATIENT
Start: 2022-02-14 | End: 2022-02-14 | Stop reason: HOSPADM

## 2022-02-14 RX ORDER — NEOSTIGMINE METHYLSULFATE 1 MG/ML
INJECTION, SOLUTION INTRAVENOUS AS NEEDED
Status: DISCONTINUED | OUTPATIENT
Start: 2022-02-14 | End: 2022-02-14 | Stop reason: SURG

## 2022-02-14 RX ORDER — LIDOCAINE HYDROCHLORIDE 10 MG/ML
0.5 INJECTION, SOLUTION EPIDURAL; INFILTRATION; INTRACAUDAL; PERINEURAL ONCE AS NEEDED
Status: COMPLETED | OUTPATIENT
Start: 2022-02-14 | End: 2022-02-14

## 2022-02-14 RX ORDER — ONDANSETRON 2 MG/ML
4 INJECTION INTRAMUSCULAR; INTRAVENOUS ONCE AS NEEDED
Status: DISCONTINUED | OUTPATIENT
Start: 2022-02-14 | End: 2022-02-14 | Stop reason: HOSPADM

## 2022-02-14 RX ORDER — SUCRALFATE 1 G/1
1 TABLET ORAL 3 TIMES DAILY PRN
COMMUNITY

## 2022-02-14 RX ORDER — PROPOFOL 10 MG/ML
VIAL (ML) INTRAVENOUS AS NEEDED
Status: DISCONTINUED | OUTPATIENT
Start: 2022-02-14 | End: 2022-02-14 | Stop reason: SURG

## 2022-02-14 RX ADMIN — HYDROMORPHONE HYDROCHLORIDE 0.5 MG: 1 INJECTION, SOLUTION INTRAMUSCULAR; INTRAVENOUS; SUBCUTANEOUS at 13:12

## 2022-02-14 RX ADMIN — PROPOFOL 25 MCG/KG/MIN: 10 INJECTION, EMULSION INTRAVENOUS at 10:20

## 2022-02-14 RX ADMIN — VANCOMYCIN HYDROCHLORIDE 1000 MG: 1 INJECTION, SOLUTION INTRAVENOUS at 09:48

## 2022-02-14 RX ADMIN — SODIUM CHLORIDE, POTASSIUM CHLORIDE, SODIUM LACTATE AND CALCIUM CHLORIDE 9 ML/HR: 600; 310; 30; 20 INJECTION, SOLUTION INTRAVENOUS at 09:26

## 2022-02-14 RX ADMIN — GLYCOPYRROLATE 0.4 MG: 0.2 INJECTION INTRAMUSCULAR; INTRAVENOUS at 11:25

## 2022-02-14 RX ADMIN — FAMOTIDINE 20 MG: 20 TABLET, FILM COATED ORAL at 09:34

## 2022-02-14 RX ADMIN — FENTANYL CITRATE 100 MCG: 50 INJECTION, SOLUTION INTRAMUSCULAR; INTRAVENOUS at 10:17

## 2022-02-14 RX ADMIN — LIDOCAINE HYDROCHLORIDE 0.5 ML: 10 INJECTION, SOLUTION EPIDURAL; INFILTRATION; INTRACAUDAL; PERINEURAL at 09:24

## 2022-02-14 RX ADMIN — FENTANYL CITRATE 50 MCG: 50 INJECTION, SOLUTION INTRAMUSCULAR; INTRAVENOUS at 12:12

## 2022-02-14 RX ADMIN — Medication 100 MCG: at 11:14

## 2022-02-14 RX ADMIN — LIDOCAINE HYDROCHLORIDE 50 MG: 10 INJECTION, SOLUTION EPIDURAL; INFILTRATION; INTRACAUDAL; PERINEURAL at 10:01

## 2022-02-14 RX ADMIN — NEOSTIGMINE METHYLSULFATE 2.5 MG: 0.5 INJECTION INTRAVENOUS at 11:25

## 2022-02-14 RX ADMIN — ROCURONIUM BROMIDE 50 MG: 10 INJECTION, SOLUTION INTRAVENOUS at 10:01

## 2022-02-14 RX ADMIN — PROPOFOL 150 MG: 10 INJECTION, EMULSION INTRAVENOUS at 10:01

## 2022-02-14 RX ADMIN — ONDANSETRON 4 MG: 2 INJECTION INTRAMUSCULAR; INTRAVENOUS at 11:24

## 2022-02-14 RX ADMIN — ROCURONIUM BROMIDE 25 MG: 10 INJECTION, SOLUTION INTRAVENOUS at 10:32

## 2022-02-14 RX ADMIN — Medication 100 MCG: at 10:59

## 2022-02-14 RX ADMIN — Medication 100 MCG: at 10:46

## 2022-02-14 RX ADMIN — FENTANYL CITRATE 50 MCG: 50 INJECTION, SOLUTION INTRAMUSCULAR; INTRAVENOUS at 12:29

## 2022-02-14 RX ADMIN — DEXAMETHASONE SODIUM PHOSPHATE 10 MG: 10 INJECTION INTRAMUSCULAR; INTRAVENOUS at 10:07

## 2022-02-14 RX ADMIN — ESMOLOL HYDROCHLORIDE 60 MG: 10 INJECTION, SOLUTION INTRAVENOUS at 10:01

## 2022-02-14 NOTE — ANESTHESIA POSTPROCEDURE EVALUATION
Patient: Camila Mayberry    Procedure Summary     Date: 02/14/22 Room / Location:  [facility]    Anesthesia Start: 0956 Anesthesia Stop:     Procedure: CERVICAL DISCECTOMY ANTERIOR WITH FUSION (N/A Spine Cervical) Diagnosis:       Cervical spondylosis with radiculopathy      (Cervical spondylosis with radiculopathy [M47.22])    Surgeons: Stephon Arriola MD Provider: Tony Thurman MD    Anesthesia Type: general ASA Status: 2          Anesthesia Type: general    Vitals  No vitals data found for the desired time range.          Post Anesthesia Care and Evaluation    Patient location during evaluation: PACU  Patient participation: complete - patient participated  Level of consciousness: responsive to verbal stimuli  Pain score: 0  Pain management: adequate  Airway patency: patent  Anesthetic complications: No anesthetic complications  PONV Status: none  Cardiovascular status: hemodynamically stable and acceptable  Respiratory status: nonlabored ventilation, acceptable, nasal cannula and spontaneous ventilation  Hydration status: acceptable    Comments: VSS  No anesthesia care post op

## 2022-02-14 NOTE — ANESTHESIA PROCEDURE NOTES
Airway  Urgency: elective    Date/Time: 2/14/2022 10:04 AM  Airway not difficult    General Information and Staff    Patient location during procedure: OR  Anesthesiologist: Tony Thurman MD  CRNA: Noel Venegas CRNA    Indications and Patient Condition  Indications for airway management: airway protection    Preoxygenated: yes  MILS not maintained throughout  Mask difficulty assessment: 1 - vent by mask    Final Airway Details  Final airway type: endotracheal airway      Successful airway: ETT  Cuffed: yes   Successful intubation technique: video laryngoscopy  Endotracheal tube insertion site: oral  Blade: [place]  Blade size: 3  ETT size (mm): 7.0  Cormack-Lehane Classification: grade I - full view of glottis  Placement verified by: chest auscultation and capnometry   Measured from: lips  ETT/EBT  to lips (cm): 20  Number of attempts at approach: 1  Assessment: lips, teeth, and gum same as pre-op and atraumatic intubation    Additional Comments  Negative epigastric sounds, Breath sound equal bilaterally with symmetric chest rise and fall

## 2022-02-14 NOTE — ANESTHESIA PREPROCEDURE EVALUATION
Anesthesia Evaluation     Patient summary reviewed and Nursing notes reviewed   history of anesthetic complications: difficult airway  NPO Solid Status: > 8 hours  NPO Liquid Status: > 2 hours           Airway   Mallampati: I  TM distance: >3 FB  Neck ROM: limited  Possible difficult intubation  Comment: [place]  Dental - normal exam     Pulmonary     breath sounds clear to auscultation  (+) asthma,  Cardiovascular   Exercise tolerance: good (4-7 METS)    ECG reviewed  Rhythm: regular  Rate: normal        Neuro/Psych  (+) headaches, numbness, psychiatric history Anxiety,    GI/Hepatic/Renal/Endo    (+)  GERD well controlled,      Musculoskeletal     Abdominal   (+) obese,     Abdomen: soft.   Substance History      OB/GYN          Other   arthritis,                    Anesthesia Plan    ASA 2     general     intravenous induction     Anesthetic plan, all risks, benefits, and alternatives have been provided, discussed and informed consent has been obtained with: patient.    Plan discussed with CRNA.        CODE STATUS:

## 2022-02-14 NOTE — INTERVAL H&P NOTE
[facility] Pre-op    Full history and physical note from office is attached.    VS: /69  HR 85  RR 16  T 97.5  sat 100%RA    Immunizations:  Influenza:  No  Pneumococcal:  No  Tetanus:  UTD  Covid x2: 2021    LAB Results:  Lab Results   Component Value Date    WBC 6.72 02/11/2022    HGB 12.7 02/11/2022    HCT 39.3 02/11/2022    MCV 91.2 02/11/2022     02/11/2022    GLUCOSE 97 08/11/2020    BUN 6 (L) 08/11/2020    CREATININE 0.20 (L) 08/11/2020    EGFRIFNONA 64 08/11/2020     08/11/2020    K 3.5 08/11/2020     08/11/2020    CO2 21.0 (L) 08/11/2020    CALCIUM 9.5 08/11/2020    ALBUMIN 4.30 08/11/2020    AST 19 08/11/2020    ALT 13 08/11/2020    BILITOT 0.2 08/11/2020 2/1/21 CT cervical spine:  IMPRESSION:  Postoperative changes are noted from prior C6-7 fusion, with  well decompressed spinal canal and neural foramina at the operative  level. Adjacent level spondylosis changes are present, most notable at  C5-6 where disc osteophyte complex and facet arthropathy results in some  mild-to-moderate narrowing of the spinal canal and moderate narrowing of  the left neural foramen.    Cancer Staging (if applicable)  Cancer Patient: __ yes __no __unknown__N/A; If yes, clinical stage T:__ N:__M:__, stage group or __N/A      Impression: Cervical spondylosis with radiculopathy       Plan: CERVICAL DISCECTOMY ANTERIOR WITH FUSION      Desire Gallegos, APRN   2/14/2022   08:59 EST

## 2022-02-14 NOTE — OP NOTE
NEUROSURGICAL OPERATIVE NOTE        PREOPERATIVE DIAGNOSIS:    Cervical spondylosis with radiculopathy      POSTOPERATIVE DIAGNOSIS:  Same      PROCEDURE:  1.  Arthrodesis C5-6  2.  Anterior cervical discectomy with microdissection C5-6  3.  Zevo anterior plating C5-6  4.  Composite allografting C5-6      SURGEON:  Stephon Arriola M.D.      ASSISTANT: Argelia Beverly PA-C    PAC assisted with:   Suctioning   Retraction   Tying   Suturing   Closing   Application of dressing   Skilled neurosurgery PA assistance was necessary to perform this procedure.        ANESTHESIA:  General      ESTIMATED BLOOD LOSS: Minimal      SPECIMEN: None      DRAINS: None      COMPLICATIONS:  None      CLINICAL NOTE:  The patient is a 71-year-old woman with a history of neck and left upper extremity pain.  This has been unremitting despite extensive time and nonoperative measures.  Studies demonstrate spondylosis with nerve root compromise.  As such, she presents at this time for ACDF C5-6.  The nature of the procedure as well as the potential risks, complications, limitations, and alternatives to the procedure were discussed at length with the patient and the patient has agreed to proceed with surgery.      TECHNICAL NOTE:  The patient was brought to the operating room and placed on the operating room table in the supine position. General endotracheal anesthesia was achieved. Her neck was mildly extended with a roll placed transversely under her shoulders. Her anterior neck was prepared and draped in the usual fashion. Her old incision at low on the neck from the midline rightward was reopened. The underlying subcutaneous tissues were undermined. The platysma was divided longitudinally. A plane medial to the belly of the sternocleidomastoid muscle was pursued to provide exposure to the anterior spine. Disc space was marked and localizing radiograph confirmed the operative level. The longus colli muscle was mobilized from the anterior  spine with cautery. Self retaining retractor provided side to side exposure. The disc was incised and evacuated piecemeal with an array of pituitary rongeurs, Julia curettes, and Kerrison punches. Distraction screws were utilized. The operating microscope was brought into use. Discectomy completed. A large amount of osteophyte was resected using Kerrison punches and angled Julia curettes. The posterior longitudinal ligament was taken down with Julia knife and Julia curettes. The neural foramina were widely decompressed with Kerrison punches. At completion of the procedure, the dural sac and nerve roots were well decompressed and a micro angled ball probe could readily be passed along the nerve roots into the foramina. Endplates were decorticated and a small wedge of bone was left posteriorly on each endplate. A 6 mm lordotic composite allograft was then impacted into place. Anterior osteophytes were resected with the drill. A 17 mm Zevo plate was then affixed to the anterior spine at C5-6 using 13 mm variable angle screws bilaterally at each level. Locking cam was engaged and will note that her bone quality was fairly poor. The wound was washed out with a saline solution. Modest bleeding points were controlled with bipolar cautery. The platysma was re-approximated in an interrupted fashion with 3-0 Vicryl suture. The skin was closed in a running subcuticular fashion with 3-0 Vicryl suture. A dermal sealant and sterile dressing were applied. She was subsequently extubated and taken to the recovery room in satisfactory condition.               Stephon Arriola M.D.

## 2022-02-16 ENCOUNTER — TELEPHONE (OUTPATIENT)
Dept: NEUROSURGERY | Facility: CLINIC | Age: 72
End: 2022-02-16

## 2022-02-16 DIAGNOSIS — M47.22 CERVICAL SPONDYLOSIS WITH RADICULOPATHY: ICD-10-CM

## 2022-02-16 DIAGNOSIS — M50.30 DDD (DEGENERATIVE DISC DISEASE), CERVICAL: ICD-10-CM

## 2022-02-16 DIAGNOSIS — Z98.1 S/P CERVICAL SPINAL FUSION: Primary | ICD-10-CM

## 2022-03-07 ENCOUNTER — HOSPITAL ENCOUNTER (OUTPATIENT)
Dept: GENERAL RADIOLOGY | Facility: HOSPITAL | Age: 72
Discharge: HOME | End: 2022-03-07
Admitting: PHYSICIAN ASSISTANT

## 2022-03-07 DIAGNOSIS — Z98.1 S/P CERVICAL SPINAL FUSION: ICD-10-CM

## 2022-03-07 DIAGNOSIS — M47.22 CERVICAL SPONDYLOSIS WITH RADICULOPATHY: ICD-10-CM

## 2022-03-07 DIAGNOSIS — M50.30 DDD (DEGENERATIVE DISC DISEASE), CERVICAL: ICD-10-CM

## 2022-03-07 PROCEDURE — 72040 X-RAY EXAM NECK SPINE 2-3 VW: CPT

## 2022-03-09 ENCOUNTER — OFFICE VISIT (OUTPATIENT)
Dept: NEUROSURGERY | Facility: CLINIC | Age: 72
End: 2022-03-09

## 2022-03-09 VITALS
WEIGHT: 156.2 LBS | HEIGHT: 65 IN | BODY MASS INDEX: 26.02 KG/M2 | TEMPERATURE: 97.8 F | DIASTOLIC BLOOD PRESSURE: 74 MMHG | SYSTOLIC BLOOD PRESSURE: 124 MMHG

## 2022-03-09 DIAGNOSIS — M50.30 DDD (DEGENERATIVE DISC DISEASE), CERVICAL: ICD-10-CM

## 2022-03-09 DIAGNOSIS — M47.22 CERVICAL SPONDYLOSIS WITH RADICULOPATHY: Primary | ICD-10-CM

## 2022-03-09 DIAGNOSIS — Z98.1 S/P CERVICAL SPINAL FUSION: ICD-10-CM

## 2022-03-09 PROCEDURE — 99024 POSTOP FOLLOW-UP VISIT: CPT | Performed by: PHYSICIAN ASSISTANT

## 2022-03-09 RX ORDER — CYANOCOBALAMIN 1000 UG/ML
INJECTION, SOLUTION INTRAMUSCULAR; SUBCUTANEOUS
COMMUNITY
Start: 2022-02-22 | End: 2022-10-12

## 2022-03-09 RX ORDER — NALOXONE HYDROCHLORIDE 4 MG/.1ML
SPRAY NASAL
COMMUNITY
Start: 2022-02-21

## 2022-03-09 NOTE — PROGRESS NOTES
"Patient: Camila Mayberry  : 1950  Chart #: [id number]    Date of Service: 2022    CHIEF COMPLAINT: Cervical spondylosis with radiculopathy    History of Present Illness Patient is a 71-year-old woman with a history of neck and left upper extremity pain.  This had become unremitting despite extensive time and nonoperative measures.  Preoperative studies demonstrated spondylosis with nerve root compromise.  As such on 2022 she underwent ACDF with allografting and plating C5-6.  Dr. Arriola noted diminished bone quality during surgery and therefore sent patient home with a [place] J collar.    Today patient is 3 weeks postop.  Her arm pain is much better.  She has little bit of sensory alteration in the hand but overall she is very pleased.  She mainly complaints of neck pain and discomfort from wearing the cervical collar.  No incisional issues      Past Medical History:   Diagnosis Date   • Anxiety     PT DENIES   • Arthritis    • Asthma    • Back problem    • CRPS (complex regional pain syndrome), lower limb    • GERD (gastroesophageal reflux disease)    • Hard to intubate     Pt states she was told, \"opening is like a child's so they had to use a child's tube on me\"   • Headache    • History of diabetes mellitus     Resolved after weight loss and diet change   • Long term prescription opiate use    • Migraine    • Osteoporosis    • Spinal headache          Current Outpatient Medications:   •  albuterol (2.5 MG/3ML) 0.083% nebulizer solution 3 mL, albuterol (5 MG/ML) 0.5% nebulizer solution 0.5 mL, Inhale Daily As Needed., Disp: , Rfl:   •  Atogepant (Qulipta) 60 MG tablet, Take 60 mg by mouth Daily. For Migraines, Disp: , Rfl:   •  diphenhydrAMINE (BENADRYL) 25 mg capsule, Take 25 mg by mouth Every 6 (Six) Hours As Needed for Itching., Disp: , Rfl:   •  HYDROcodone-acetaminophen (NORCO)  MG per tablet, Take 1 tablet by mouth Every 6 (Six) Hours As Needed for Moderate Pain ., Disp: 25 " tablet, Rfl: 0  •  lidocaine (LIDODERM) 5 %, Place 1 patch on the skin as directed by provider Daily As Needed for Mild Pain . Remove & Discard patch within 12 hours or as directed by MD , Disp: , Rfl:   •  rizatriptan MLT (MAXALT-MLT) 10 MG disintegrating tablet, Take 10 mg by mouth 1 (One) Time As Needed for Migraine. May repeat in 2 hours if needed, Disp: , Rfl:   •  sucralfate (CARAFATE) 1 g tablet, Take 1 g by mouth 3 (Three) Times a Day As Needed., Disp: , Rfl:   •  topiramate (TOPAMAX) 100 MG tablet, Take 100 mg by mouth 2 (Two) Times a Day., Disp: , Rfl:   •  traZODone (DESYREL) 100 MG tablet, Take 100 mg by mouth every night at bedtime., Disp: , Rfl:   No current facility-administered medications for this visit.    Facility-Administered Medications Ordered in Other Visits:   •  mupirocin (BACTROBAN) 2 % nasal ointment, , Nasal, BID, Stephon Arriola MD    Past Surgical History:   Procedure Laterality Date   • ANTERIOR CERVICAL DISCECTOMY W/ FUSION  05/15/2009    ACDF C6/7 (Dr. Arriola)   • ANTERIOR CERVICAL DISCECTOMY W/ FUSION N/A 2/14/2022    Procedure: CERVICAL DISCECTOMY ANTERIOR WITH FUSION C5-6;  Surgeon: Stephon Arriola MD;  Location: [facility] OR;  Service: Neurosurgery;  Laterality: N/A;   • APPENDECTOMY     • CARDIAC CATHETERIZATION N/A 8/11/2020    NO STENTS -- Procedure: Left Heart Cath;  Surgeon: Tim Quiroz MD;  Location: [facility] CATH INVASIVE LOCATION;  Service: Cardiovascular;  Laterality: N/A;   • CATARACT EXTRACTION  1996/1998   • CHOLECYSTECTOMY     • COLONOSCOPY     • FOOT SURGERY  2009   • HYSTERECTOMY      AGE 37   • SPINAL CORD STIMULATOR IMPLANT  2013    [employer] [facility]       Social History     Socioeconomic History   • Marital status:    Tobacco Use   • Smoking status: Never Smoker   • Smokeless tobacco: Never Used   Vaping Use   • Vaping Use: Never used   Substance and Sexual Activity   • Alcohol use: No   • Drug use: No   • Sexual activity: Defer          Review of Systems    Objective   Vital Signs: not currently breastfeeding.  Physical Exam  Vitals and nursing note reviewed.   Constitutional:       General: She is not in acute distress.     Appearance: She is well-developed.   HENT:      Head: Normocephalic and atraumatic.   Eyes:      Pupils: Pupils are equal, round, and reactive to light.   Skin:     Comments: Well healed anterior neck incision    Psychiatric:         Behavior: Behavior normal.         Thought Content: Thought content normal.           Independent review of radiographic imaging: Plain films of the cervical spine demonstrate intact surgical construct at C5-6.  Remnants of the prior Mystique plate is present at C6-7.    Assessment/Plan    Diagnosis: Cervical spondylosis with left upper extremity radiculopathy status post ACDF C5-6    Medical Decision Making: Patient is 3 weeks post-op and doing well. Her pre-operative radicular complaints have improved almost completely.  She is mainly aggravated by the [place] J collar. Dr Arriola would like her to continue wearing this due to her diminished bone density for another 3 weeks and then she may wean from it.      Follow up with Dr Arriola in 6-8 weeks.           Diagnoses and all orders for this visit:    1. Cervical spondylosis with radiculopathy (Primary)    2. DDD (degenerative disc disease), cervical    3. S/P cervical spinal fusion                        Alix Cortez PA-C  Patient Care Team:  Travis Linton MD as PCP - General (Family Medicine)  Thor Verduzco DO as Referring Physician (Anesthesiology)  Mckinley Gilliland MD as Consulting Physician (Gastroenterology)

## 2022-04-14 ENCOUNTER — TELEPHONE (OUTPATIENT)
Dept: NEUROSURGERY | Facility: CLINIC | Age: 72
End: 2022-04-14

## 2022-04-14 NOTE — TELEPHONE ENCOUNTER
Provider: Flako  Caller: patient  Time of call:  2:20   Phone #:  [phone]  Surgery:  Cervical Discectomy  Surgery Date:  2-14-22  Last visit:   3-9-22  Next visit: 4-19-22    GABI:         Reason for call:  Patient wanted to know if she needed more x-rays done before she came in. I have looked in the notes from her last visit and didn't see any orders.  I have called the patient and left a VM letting her know that there was nothing ordered to just come in for her next post op visit.   Thank you.    Patient called back at 3:36 and wanted to make sure I understood her question about the x-rays. I told her that there was no orders put in for anymore x-rays. She was thankful for the call back.

## 2022-04-19 ENCOUNTER — OFFICE VISIT (OUTPATIENT)
Dept: NEUROSURGERY | Facility: CLINIC | Age: 72
End: 2022-04-19

## 2022-04-19 VITALS — HEIGHT: 65 IN | WEIGHT: 148.6 LBS | RESPIRATION RATE: 15 BRPM | BODY MASS INDEX: 24.76 KG/M2

## 2022-04-19 DIAGNOSIS — Z98.1 S/P CERVICAL SPINAL FUSION: Primary | ICD-10-CM

## 2022-04-19 DIAGNOSIS — M50.30 DDD (DEGENERATIVE DISC DISEASE), CERVICAL: ICD-10-CM

## 2022-04-19 DIAGNOSIS — M47.22 CERVICAL SPONDYLOSIS WITH RADICULOPATHY: ICD-10-CM

## 2022-04-19 PROCEDURE — 99024 POSTOP FOLLOW-UP VISIT: CPT | Performed by: NEUROLOGICAL SURGERY

## 2022-04-19 RX ORDER — CELECOXIB 200 MG/1
CAPSULE ORAL
COMMUNITY
Start: 2022-03-18 | End: 2023-03-28

## 2022-04-19 NOTE — PROGRESS NOTES
Patient: Camila Mayberry  : 1950    Primary Care Provider: Travis Linton MD    Requesting Provider: As above        History    Chief Complaint: Neck and left upper extremity pain.    History of Present Illness: Ms. Mayberry is a 71-year-old woman who remotely had undergone ACDF at C6-7 utilizing a [place] plate.  More recently she presented with neck and left upper extremity pain and on 2022 she underwent ACDF at C5-6.  Her bone quality was quite poor so she was maintained in a collar.  Her left upper extremity symptoms have largely resolved.  She has some axial neck discomfort.  She is weaning out of her collar and is now only wearing it at night.    Review of Systems   Constitutional: Negative for activity change, appetite change, chills, diaphoresis, fatigue, fever and unexpected weight change.   HENT: Positive for postnasal drip. Negative for congestion, dental problem, drooling, ear discharge, ear pain, facial swelling, hearing loss, mouth sores, nosebleeds, rhinorrhea, sinus pressure, sneezing, sore throat, tinnitus, trouble swallowing and voice change.    Eyes: Negative for photophobia, pain, discharge, redness, itching and visual disturbance.   Respiratory: Negative for apnea, cough, choking, chest tightness, shortness of breath, wheezing and stridor.    Cardiovascular: Negative for chest pain, palpitations and leg swelling.   Gastrointestinal: Positive for abdominal pain. Negative for abdominal distention, anal bleeding, blood in stool, constipation, diarrhea, nausea, rectal pain and vomiting.   Endocrine: Negative for cold intolerance, heat intolerance, polydipsia, polyphagia and polyuria.   Genitourinary: Negative for decreased urine volume, difficulty urinating, dysuria, enuresis, flank pain, frequency, genital sores, hematuria and urgency.   Musculoskeletal: Positive for arthralgias and neck stiffness. Negative for back pain, gait problem, joint swelling, myalgias and neck pain.  "  Skin: Negative for color change, pallor, rash and wound.   Allergic/Immunologic: Negative for environmental allergies, food allergies and immunocompromised state.   Neurological: Positive for headaches. Negative for dizziness, tremors, seizures, syncope, facial asymmetry, speech difficulty, weakness, light-headedness and numbness.   Hematological: Negative for adenopathy. Does not bruise/bleed easily.   Psychiatric/Behavioral: Negative for agitation, behavioral problems, confusion, decreased concentration, dysphoric mood, hallucinations, self-injury, sleep disturbance and suicidal ideas. The patient is not nervous/anxious and is not hyperactive.    All other systems reviewed and are negative.      The patient's past medical history, past surgical history, family history, and social history have been reviewed at length in the electronic medical record.    Physical Exam:   Resp 15   Ht 165.1 cm (65\")   Wt 67.4 kg (148 lb 9.6 oz)   BMI 24.73 kg/m²   Her right anterior cervical incision looks good.    Medical Decision Making    Data Review:   (All imaging studies were personally reviewed unless stated otherwise)  Plain films of the cervical spine dated 3/7/2022 demonstrate good position of her construct at C5-6.  Residual from her prior plating system is noted within the C7 vertebral body.    Diagnosis:   Cervical spondylosis with radiculopathy status post ACDF C5-6.    Treatment Options:   The patient may wean out of her collar entirely.  I have cleared her to resume driving.  She will follow-up in our clinic in 6 months with new plain films of her cervical spine to assess her fusion.       Diagnosis Plan   1. S/P cervical spinal fusion     2. Cervical spondylosis with radiculopathy     3. DDD (degenerative disc disease), cervical         Scribed for Stephon Arriola MD by Patricia Cazares CMA on 4/19/2022 11:37 EDT       I, Dr. Arriola, personally performed the services described in the documentation, as " scribed in my presence, and it is both accurate and complete.

## 2022-05-24 ENCOUNTER — TELEPHONE (OUTPATIENT)
Dept: NEUROSURGERY | Facility: CLINIC | Age: 72
End: 2022-05-24

## 2022-05-24 DIAGNOSIS — M47.812 CERVICAL SPONDYLOSIS WITHOUT MYELOPATHY: ICD-10-CM

## 2022-05-24 DIAGNOSIS — Z98.1 S/P CERVICAL SPINAL FUSION: Primary | ICD-10-CM

## 2022-05-24 DIAGNOSIS — M50.30 DEGENERATION OF CERVICAL INTERVERTEBRAL DISC: ICD-10-CM

## 2022-05-24 NOTE — TELEPHONE ENCOUNTER
Patient notified. She is going to call back or have PT call back with all of the details they need and where it needs to be faxed because she is not sure if she needs a new referral or if she just needs a clearance letter.

## 2022-05-24 NOTE — TELEPHONE ENCOUNTER
Provider:  Flako  Surgery:  CERVICAL DISCECTOMY ANTERIOR WITH FUSION C5-6  Surgery Date: 2-14-22  Last visit:   4-19-22  Next visit: 10-12-22    GABI:         Reason for call:   Patient  is calling to see if she could start PT on her right shoulder.  They will not be touching her neck but, they will not do anything unless Dr. Arriola is ok with this? Please Advise. Thank you.

## 2022-05-25 NOTE — TELEPHONE ENCOUNTER
[facility] is requesting the PT order to be faxed to their office. Their fax number is [phone].

## 2022-05-31 DIAGNOSIS — Z98.1 S/P CERVICAL SPINAL FUSION: ICD-10-CM

## 2022-05-31 DIAGNOSIS — M50.30 DEGENERATION OF CERVICAL INTERVERTEBRAL DISC: ICD-10-CM

## 2022-05-31 DIAGNOSIS — M50.30 DDD (DEGENERATIVE DISC DISEASE), CERVICAL: ICD-10-CM

## 2022-05-31 DIAGNOSIS — M47.812 CERVICAL SPONDYLOSIS WITHOUT MYELOPATHY: Primary | ICD-10-CM

## 2022-05-31 NOTE — TELEPHONE ENCOUNTER
"PT order pending, please sign if approved.    Last OV:    \"Treatment Options:   The patient may wean out of her collar entirely.  I have cleared her to resume driving.  She will follow-up in our clinic in 6 months with new plain films of her cervical spine to assess her fusion.\"  "

## 2022-06-28 ENCOUNTER — TELEPHONE (OUTPATIENT)
Dept: NEUROSURGERY | Facility: CLINIC | Age: 72
End: 2022-06-28

## 2022-06-28 NOTE — TELEPHONE ENCOUNTER
Patient may do any active exercise that she can tolerate. No sustained overhead work and absolutely no outside manipulation of the cervical spine.

## 2022-06-28 NOTE — TELEPHONE ENCOUNTER
Provider:  Flako  Surgery:  CERVICAL DISCECTOMY ANTERIOR WITH FUSION C5-6  Surgery Date:  2-  Last visit:  4-   Next visit: 10-    GABI:         Reason for call:  [facility] called and wanted to know if it would be ok for the patient to have a certain exercise. IT would be seated cervical lateral side bending, with arms supported. Please Advise. Thank you.

## 2022-06-29 NOTE — TELEPHONE ENCOUNTER
I called PT back and talked to Maritza.  I gave her the message from Rebecca word for word so there was no misunderstanding. She was thankful for the call back.

## 2022-10-11 ENCOUNTER — HOSPITAL ENCOUNTER (OUTPATIENT)
Dept: GENERAL RADIOLOGY | Facility: HOSPITAL | Age: 72
Discharge: HOME | End: 2022-10-11
Admitting: NEUROLOGICAL SURGERY

## 2022-10-11 DIAGNOSIS — Z98.1 S/P CERVICAL SPINAL FUSION: ICD-10-CM

## 2022-10-11 PROCEDURE — 72040 X-RAY EXAM NECK SPINE 2-3 VW: CPT

## 2022-10-12 ENCOUNTER — OFFICE VISIT (OUTPATIENT)
Dept: NEUROSURGERY | Facility: CLINIC | Age: 72
End: 2022-10-12

## 2022-10-12 VITALS — HEIGHT: 65 IN | BODY MASS INDEX: 26.99 KG/M2 | TEMPERATURE: 98.5 F | WEIGHT: 162 LBS

## 2022-10-12 DIAGNOSIS — M47.812 CERVICAL SPONDYLOSIS WITHOUT MYELOPATHY: ICD-10-CM

## 2022-10-12 DIAGNOSIS — M47.22 CERVICAL SPONDYLOSIS WITH RADICULOPATHY: ICD-10-CM

## 2022-10-12 DIAGNOSIS — M50.30 DEGENERATION OF CERVICAL INTERVERTEBRAL DISC: ICD-10-CM

## 2022-10-12 DIAGNOSIS — Z98.1 S/P CERVICAL SPINAL FUSION: Primary | ICD-10-CM

## 2022-10-12 DIAGNOSIS — M50.30 DDD (DEGENERATIVE DISC DISEASE), CERVICAL: ICD-10-CM

## 2022-10-12 PROCEDURE — 99213 OFFICE O/P EST LOW 20 MIN: CPT | Performed by: PHYSICIAN ASSISTANT

## 2022-10-12 RX ORDER — NITROFURANTOIN 25; 75 MG/1; MG/1
CAPSULE ORAL
COMMUNITY
Start: 2022-10-11 | End: 2023-03-28

## 2022-10-12 NOTE — PROGRESS NOTES
"Patient: Camila Mayberry  : 1950  Chart #: [id number]    Date of Service: 10/12/2022    CHIEF COMPLAINT: Neck and left upper extremity pain    History of Present Illness Patient is a 72-year-old woman who remotely underwent ACDF at C6/7 utilizing a mystic plate.  More recently she presented with neck and left upper extremity pain and ultimately on 2022 she underwent ACDF at C5-6.  Her bone quality was felt to be poor so she wore a cervical collar for the first couple months postop.  She is now finished with that.  She has mild neck discomfort and stiffness in her shoulders occasionally.  She continues to work with physical therapy.  Overall she is doing quite well and has no complaints today      Past Medical History:   Diagnosis Date   • Anxiety     PT DENIES   • Arthritis    • Asthma    • Back problem    • CRPS (complex regional pain syndrome), lower limb    • GERD (gastroesophageal reflux disease)    • Hard to intubate     Pt states she was told, \"opening is like a child's so they had to use a child's tube on me\"   • Headache    • History of diabetes mellitus     Resolved after weight loss and diet change   • Long term prescription opiate use    • Migraine    • Osteoporosis    • Spinal headache          Current Outpatient Medications:   •  albuterol (2.5 MG/3ML) 0.083% nebulizer solution 3 mL, albuterol (5 MG/ML) 0.5% nebulizer solution 0.5 mL, Inhale Daily As Needed., Disp: , Rfl:   •  celecoxib (CeleBREX) 200 MG capsule, , Disp: , Rfl:   •  Cholecalciferol (Vitamin D3) 1.25 MG (97248 UT) tablet, Take  by mouth., Disp: , Rfl:   •  diphenhydrAMINE (BENADRYL) 25 mg capsule, Take 25 mg by mouth Every 6 (Six) Hours As Needed for Itching., Disp: , Rfl:   •  HYDROcodone-acetaminophen (NORCO)  MG per tablet, Take 1 tablet by mouth Every 6 (Six) Hours As Needed for Moderate Pain ., Disp: 25 tablet, Rfl: 0  •  lidocaine (LIDODERM) 5 %, Place 1 patch on the skin as directed by provider Daily As " Needed for Mild Pain . Remove & Discard patch within 12 hours or as directed by MD, Disp: , Rfl:   •  Narcan 4 MG/0.1ML nasal spray, ADMINISTER 1 SPRAY INTO NOSTRIL AS NEEDED FOR OPIOID OVERDOSE REVERSAL. CALL 911. REPEAT EVERY 2 TO 3 MINUTES AS NEEDED ALTERNATING NOSTRILS, Disp: , Rfl:   •  nitrofurantoin, macrocrystal-monohydrate, (MACROBID) 100 MG capsule, , Disp: , Rfl:   •  rizatriptan MLT (MAXALT-MLT) 10 MG disintegrating tablet, Take 10 mg by mouth 1 (One) Time As Needed for Migraine. May repeat in 2 hours if needed, Disp: , Rfl:   •  sucralfate (CARAFATE) 1 g tablet, Take 1 g by mouth 3 (Three) Times a Day As Needed., Disp: , Rfl:   •  topiramate (TOPAMAX) 100 MG tablet, Take 100 mg by mouth 2 (Two) Times a Day., Disp: , Rfl:   •  traZODone (DESYREL) 100 MG tablet, Take 100 mg by mouth every night at bedtime., Disp: , Rfl:   No current facility-administered medications for this visit.    Facility-Administered Medications Ordered in Other Visits:   •  mupirocin (BACTROBAN) 2 % nasal ointment, , Nasal, BID, Stephon Arriola MD    Past Surgical History:   Procedure Laterality Date   • ANTERIOR CERVICAL DISCECTOMY W/ FUSION  05/15/2009    ACDF C6/7 (Dr. Arriola)   • ANTERIOR CERVICAL DISCECTOMY W/ FUSION N/A 2/14/2022    Procedure: CERVICAL DISCECTOMY ANTERIOR WITH FUSION C5-6;  Surgeon: Stephon Arriola MD;  Location:  [facility];  Service: Neurosurgery;  Laterality: N/A;   • APPENDECTOMY     • CARDIAC CATHETERIZATION N/A 8/11/2020    NO STENTS -- Procedure: Left Heart Cath;  Surgeon: Tim Quiroz MD;  Location:  [facility];  Service: Cardiovascular;  Laterality: N/A;   • CATARACT EXTRACTION  1996/1998   • CHOLECYSTECTOMY     • COLONOSCOPY     • FOOT SURGERY  2009   • HYSTERECTOMY      AGE 37   • SPINAL CORD STIMULATOR IMPLANT  2013    [employer] [facility]       Social History     Socioeconomic History   • Marital status:    Tobacco Use   • Smoking status: Never   •  Smokeless tobacco: Never   Vaping Use   • Vaping Use: Never used   Substance and Sexual Activity   • Alcohol use: No   • Drug use: No   • Sexual activity: Defer         Review of Systems   Constitutional: Negative for activity change, appetite change, chills, diaphoresis, fatigue, fever and unexpected weight change.   HENT: Negative for congestion, dental problem, drooling, ear discharge, ear pain, facial swelling, hearing loss, mouth sores, nosebleeds, postnasal drip, rhinorrhea, sinus pressure, sinus pain, sneezing, sore throat, tinnitus, trouble swallowing and voice change.    Eyes: Negative for photophobia, pain, discharge, redness, itching and visual disturbance.   Respiratory: Negative for apnea, cough, choking, chest tightness, shortness of breath, wheezing and stridor.    Cardiovascular: Negative for chest pain, palpitations and leg swelling.   Gastrointestinal: Negative for abdominal distention, abdominal pain, anal bleeding, blood in stool, constipation, diarrhea, nausea, rectal pain and vomiting.   Endocrine: Negative for cold intolerance, heat intolerance, polydipsia, polyphagia and polyuria.   Genitourinary: Negative for decreased urine volume, difficulty urinating, dyspareunia, dysuria, enuresis, flank pain, frequency, genital sores, hematuria, menstrual problem, pelvic pain, urgency, vaginal bleeding, vaginal discharge and vaginal pain.   Musculoskeletal: Negative for arthralgias, back pain, gait problem, joint swelling, myalgias, neck pain and neck stiffness.   Skin: Negative for color change, pallor, rash and wound.   Allergic/Immunologic: Negative for environmental allergies, food allergies and immunocompromised state.   Neurological: Negative for dizziness, tremors, seizures, syncope, facial asymmetry, speech difficulty, weakness, light-headedness, numbness and headaches.   Hematological: Negative for adenopathy. Does not bruise/bleed easily.   Psychiatric/Behavioral: Negative for agitation,  "behavioral problems, confusion, decreased concentration, dysphoric mood, hallucinations, self-injury, sleep disturbance and suicidal ideas. The patient is not nervous/anxious and is not hyperactive.        Objective   Vital Signs: Temperature 98.5 °F (36.9 °C), height 165.1 cm (65\"), weight 73.5 kg (162 lb), not currently breastfeeding.  Physical Exam  Vitals and nursing note reviewed.   Constitutional:       General: She is not in acute distress.     Appearance: She is well-developed.   HENT:      Head: Normocephalic and atraumatic.   Psychiatric:         Behavior: Behavior normal.         Thought Content: Thought content normal.     Musculoskeletal:     Strength is intact in upper and lower extremities to direct testing.  Neurologic:     Muscle tone is normal throughout.     Patient is oriented to person, place, and time.         Independent review of radiographic imaging: Plain films of the cervical spine demonstrate hardware intact at C5-6 with evidence of osseous fusion taking place.  There is postoperative changes from previous spinal fusion at C6-7.    Assessment & Plan   Diagnosis: Cervical spondylosis with radiculopathy status post ACDF C5-6     Medical Decision Making: Patient is 6 months postop and doing well.  She continues with physical therapy twice a week.  This helps with both her neck and shoulder limited range of motion.  She has no complaints today.  She will follow-up in 6 months with another set of plain films.  At that time, she wants to discuss removing her old spinal cord stimulator as she can no longer have MRIs.    Diagnoses and all orders for this visit:    1. S/P cervical spinal fusion (Primary)  -     XR Spine Cervical 2 View; Future    2. Degeneration of cervical intervertebral disc    3. Cervical spondylosis without myelopathy    4. DDD (degenerative disc disease), cervical    5. Cervical spondylosis with radiculopathy                      Alix Cortez PA-C  Patient Care " Team:  Travis Linton MD as PCP - General (Family Medicine)  Thor Verduzco DO as Referring Physician (Anesthesiology)  Mckinley Gilliland MD as Consulting Physician (Gastroenterology)

## 2023-02-08 ENCOUNTER — TELEPHONE (OUTPATIENT)
Dept: CARDIOLOGY | Facility: CLINIC | Age: 73
End: 2023-02-08

## 2023-02-08 ENCOUNTER — TRANSCRIBE ORDERS (OUTPATIENT)
Dept: ADMINISTRATIVE | Facility: HOSPITAL | Age: 73
End: 2023-02-08
Payer: MEDICARE

## 2023-02-08 DIAGNOSIS — Z12.31 ENCOUNTER FOR SCREENING MAMMOGRAM FOR MALIGNANT NEOPLASM OF BREAST: Primary | ICD-10-CM

## 2023-02-08 NOTE — TELEPHONE ENCOUNTER
The [facility] received a fax that requires your attention. The document has been indexed to the patient’s chart for your review.      Reason for sending: CARDIOLYTE STRESS TEST ORDER    Documents Description: ORDER FOR STRESS TEST, PT DEMO, PROG NOTE LABS AND PT INS CARD    Name of Sender: [facility]    Date Indexed: 02/08/2023

## 2023-02-09 NOTE — TELEPHONE ENCOUNTER
This is a referral for a Consult. This pt needs a new patient appt first with our office. Can be schedule with any provider. Okay to schedule next available which is sometime in into March.

## 2023-03-15 ENCOUNTER — HOSPITAL ENCOUNTER (OUTPATIENT)
Dept: MAMMOGRAPHY | Facility: HOSPITAL | Age: 73
Discharge: HOME | End: 2023-03-15
Admitting: OBSTETRICS & GYNECOLOGY
Payer: MEDICARE

## 2023-03-15 DIAGNOSIS — Z12.31 ENCOUNTER FOR SCREENING MAMMOGRAM FOR MALIGNANT NEOPLASM OF BREAST: ICD-10-CM

## 2023-03-15 PROCEDURE — 77063 BREAST TOMOSYNTHESIS BI: CPT | Performed by: RADIOLOGY

## 2023-03-15 PROCEDURE — 77067 SCR MAMMO BI INCL CAD: CPT | Performed by: RADIOLOGY

## 2023-03-15 PROCEDURE — 77063 BREAST TOMOSYNTHESIS BI: CPT

## 2023-03-15 PROCEDURE — 77067 SCR MAMMO BI INCL CAD: CPT

## 2023-03-28 ENCOUNTER — OFFICE VISIT (OUTPATIENT)
Dept: CARDIOLOGY | Facility: CLINIC | Age: 73
End: 2023-03-28
Payer: MEDICARE

## 2023-03-28 VITALS
DIASTOLIC BLOOD PRESSURE: 70 MMHG | BODY MASS INDEX: 28.66 KG/M2 | OXYGEN SATURATION: 97 % | HEART RATE: 87 BPM | SYSTOLIC BLOOD PRESSURE: 130 MMHG | HEIGHT: 65 IN | WEIGHT: 172 LBS

## 2023-03-28 DIAGNOSIS — G47.33 OSA (OBSTRUCTIVE SLEEP APNEA): ICD-10-CM

## 2023-03-28 DIAGNOSIS — R07.89 CHEST PAIN, ATYPICAL: ICD-10-CM

## 2023-03-28 DIAGNOSIS — R42 DIZZINESS: ICD-10-CM

## 2023-03-28 PROBLEM — K21.9 GASTROESOPHAGEAL REFLUX DISEASE WITHOUT ESOPHAGITIS: Status: ACTIVE | Noted: 2022-11-14

## 2023-03-28 PROBLEM — K58.0 IRRITABLE BOWEL SYNDROME WITH DIARRHEA: Status: ACTIVE | Noted: 2022-06-07

## 2023-03-28 PROBLEM — K59.1 FUNCTIONAL DIARRHEA: Status: ACTIVE | Noted: 2022-06-07

## 2023-03-28 PROCEDURE — 93000 ELECTROCARDIOGRAM COMPLETE: CPT | Performed by: NURSE PRACTITIONER

## 2023-03-28 PROCEDURE — 99214 OFFICE O/P EST MOD 30 MIN: CPT | Performed by: NURSE PRACTITIONER

## 2023-03-28 RX ORDER — ESTRADIOL 0.1 MG/G
CREAM VAGINAL
COMMUNITY
Start: 2023-02-08

## 2023-03-28 RX ORDER — METHOCARBAMOL 750 MG/1
50000 TABLET ORAL
COMMUNITY
Start: 2023-01-16 | End: 2023-03-28

## 2023-03-28 RX ORDER — METHENAMINE HIPPURATE 1000 MG/1
1 TABLET ORAL EVERY 12 HOURS SCHEDULED
COMMUNITY
Start: 2022-12-15 | End: 2023-03-28

## 2023-03-28 RX ORDER — CELECOXIB 200 MG/1
CAPSULE ORAL EVERY 24 HOURS
COMMUNITY

## 2023-03-28 RX ORDER — PANTOPRAZOLE SODIUM 40 MG/1
40 TABLET, DELAYED RELEASE ORAL
COMMUNITY
Start: 2023-03-14

## 2023-03-28 NOTE — PROGRESS NOTES
[facility] Provider Note     Date:   2023   Name: Camila Mayberry  :   1950  PCP: Travis Linton MD    Chief Complaint   Patient presents with   • Establish Care   • Chest Pain       Subjective     History of Present Illnes  Camila Mayberry is a 72 y.o. female who presents today to reestablish cardiac and sleep care.  She reports that she is feeling extreme fatigue.  She will be sitting watching TV and fall asleep in the entire she will be over.  She said I could turn the lights off in the exam room and she could lay down and take a long nap.  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  She is going to consider doing another trial as this was 40 years ago.  I told her we could even update her sleep study to see where her sleep apnea is at now.    She is complaining of new chest pain and dizziness for the last couple months.  It happens 3-4 times a week since late last year.  It is a squeezing feeling.  She sees physical therapist and they tried to release it, since that did not work it made her feel like it could possibly be cardiac instead of muscular.    She has coexisting migraines and complex regional pain syndrome in her lower extremities.  She is going to follow-up with Dr. Priest in ENT soon she saw ENT but they think her dizziness issues is related to migraines.    She is agreeable to updating echo, nuclear stress test and she cannot walk on treadmill due to shortness of air and complex regional pain syndrome, repeat carotids, declines repeating home sleep study or do another trial of CPAP at this time.  EKG sinus rhythm with possible left atrial enlargement and possible septal infarct.  She also has 2 pain stimulators in place.  1 is not working.       Reports Denies   Chest Pain [x] []   Shortness of Air [x] []   Palpitations [] [x]   Edema [x] []   Dizziness [x] []   Syncope [] [x]     Allergies   Allergen  Reactions   • Desipramine Hcl Hives   • Imitrex [Sumatriptan] Hives   • Adhesive Tape Rash   • Codeine Nausea Only   • Erythromycin Nausea And Vomiting   • Penicillins Rash   • Wound Dressing Adhesive Unknown (See Comments)       Current Outpatient Medications:   •  albuterol (2.5 MG/3ML) 0.083% nebulizer solution 3 mL, albuterol (5 MG/ML) 0.5% nebulizer solution 0.5 mL, Inhale Daily As Needed., Disp: , Rfl:   •  celecoxib (CeleBREX) 200 MG capsule, Daily., Disp: , Rfl:   •  Cholecalciferol (Vitamin D3) 1.25 MG (97370 UT) tablet, Take  by mouth., Disp: , Rfl:   •  diphenhydrAMINE (BENADRYL) 25 mg capsule, Take 1 capsule by mouth Every 6 (Six) Hours As Needed for Itching., Disp: , Rfl:   •  estradiol (ESTRACE) 0.1 MG/GM vaginal cream, USE 1 GRAM VAGINALLY EVERY NIGHT, Disp: , Rfl:   •  HYDROcodone-acetaminophen (NORCO)  MG per tablet, Take 1 tablet by mouth Every 6 (Six) Hours As Needed for Moderate Pain ., Disp: 25 tablet, Rfl: 0  •  lidocaine (LIDODERM) 5 %, Place 1 patch on the skin as directed by provider Daily As Needed for Mild Pain. Remove & Discard patch within 12 hours or as directed by MD, Disp: , Rfl:   •  Narcan 4 MG/0.1ML nasal spray, ADMINISTER 1 SPRAY INTO NOSTRIL AS NEEDED FOR OPIOID OVERDOSE REVERSAL. CALL 911. REPEAT EVERY 2 TO 3 MINUTES AS NEEDED ALTERNATING NOSTRILS, Disp: , Rfl:   •  pantoprazole (PROTONIX) 40 MG EC tablet, Take 1 tablet by mouth Before Breakfast., Disp: , Rfl:   •  rizatriptan MLT (MAXALT-MLT) 10 MG disintegrating tablet, Take 1 tablet by mouth 1 (One) Time As Needed for Migraine. May repeat in 2 hours if needed, Disp: , Rfl:   •  sucralfate (CARAFATE) 1 g tablet, Take 1 tablet by mouth 3 (Three) Times a Day As Needed., Disp: , Rfl:   •  topiramate (TOPAMAX) 100 MG tablet, Take 1 tablet by mouth 2 (Two) Times a Day., Disp: , Rfl:   •  traZODone (DESYREL) 100 MG tablet, Take 1 tablet by mouth every night at bedtime., Disp: , Rfl:   No current facility-administered  "medications for this visit.    Facility-Administered Medications Ordered in Other Visits:   •  mupirocin (BACTROBAN) 2 % nasal ointment, , Nasal, BID, Stephon Arriola MD    Past Medical History:   Diagnosis Date   • Anxiety     PT DENIES   • Arthritis    • Asthma    • Back problem    • Complex regional pain syndrome I    • CRPS (complex regional pain syndrome), lower limb    • Exertional asthma    • Hard to intubate     Pt states she was told, \"opening is like a child's so they had to use a child's tube on me\"   • Headache    • History of diabetes mellitus     Resolved after weight loss and diet change   • Long term prescription opiate use    • Migraine    • DEANDRE (obstructive sleep apnea)     MILD; OFF TX   • Osteoporosis    • SOB (shortness of breath)    • Spinal headache       Past Surgical History:   Procedure Laterality Date   • ANTERIOR CERVICAL DISCECTOMY W/ FUSION  05/15/2009    ACDF C6/7 (Dr. Arriola)   • ANTERIOR CERVICAL DISCECTOMY W/ FUSION N/A 02/14/2022    Procedure: CERVICAL DISCECTOMY ANTERIOR WITH FUSION C5-6;  Surgeon: Stephon Arriola MD;  Location:  [facility] OR;  Service: Neurosurgery;  Laterality: N/A;   • APPENDECTOMY     • CARDIAC CATHETERIZATION N/A 08/11/2020    NO STENTS -- Procedure: Left Heart Cath;  Surgeon: Tim Quiroz MD;  Location:  [facility] CATH INVASIVE LOCATION;  Service: Cardiovascular;  Laterality: N/A;   • CATARACT EXTRACTION  1996/1998   • CHOLECYSTECTOMY     • COLONOSCOPY     • FOOT SURGERY  2009   • HYSTERECTOMY      AGE 37   • SPINAL CORD STIMULATOR IMPLANT  2013    [employer] [facility]     Family History   Problem Relation Age of Onset   • Breast cancer Mother 59   • Cancer Father         PROSTATE   • Hyperlipidemia Sister    • Hypertension Sister    • Breast cancer Sister 30   • Hypertension Brother    • Arthritis Brother    • Hypertension Brother    • Diabetes Brother    • Heart attack Brother    • Arthritis Brother    • Arthritis Son    • Ovarian cancer Neg Hx " "     Social History     Socioeconomic History   • Marital status:    • Number of children: 3   Tobacco Use   • Smoking status: Never   • Smokeless tobacco: Never   Vaping Use   • Vaping Use: Never used   Substance and Sexual Activity   • Alcohol use: No   • Drug use: No   • Sexual activity: Defer       Objective     Vital Signs:  /70   Pulse 87   Ht 165.1 cm (65\")   Wt 78 kg (172 lb)   SpO2 97%   BMI 28.62 kg/m²   Estimated body mass index is 28.62 kg/m² as calculated from the following:    Height as of this encounter: 165.1 cm (65\").    Weight as of this encounter: 78 kg (172 lb).             Physical Exam  Vitals reviewed.   Eyes:      Pupils: Pupils are equal, round, and reactive to light.   Cardiovascular:      Rate and Rhythm: Normal rate and regular rhythm.      Heart sounds: Normal heart sounds.   Pulmonary:      Effort: Pulmonary effort is normal.   Skin:     General: Skin is warm and dry.   Neurological:      Mental Status: She is alert and oriented to person, place, and time.   Psychiatric:         Mood and Affect: Mood normal.                 ECG 12 Lead    Date/Time: 3/28/2023 2:30 PM  Performed by: Silva Carpio APRN  Authorized by: Silva Carpio APRN   Comparison: compared with previous ECG from 2/11/2022  Similar to previous ECG  Rhythm: sinus rhythm    Clinical impression: abnormal EKG  Comments: Possible left atrial enlargement.  Possible septal infarct.             Assessment and Plan     Diagnoses and all orders for this visit:    1. Chest pain, atypical  Assessment & Plan:  We will order echo and stress test    Orders:  -     ECG 12 Lead  -     Stress Test With Myocardial Perfusion One Day; Future  -     Adult Transthoracic Echo Complete W/ Cont if Necessary Per Protocol; Future    2. DEANDRE (obstructive sleep apnea)  Assessment & Plan:  Intolerant to PAP therapy 40 years ago.  Plans to consider doing another trial of CPAP therapy or repeating sleep " study.      3. Dizziness  Assessment & Plan:  We will order carotids, echo, stress test.  Patient is also followed back up with neurology for headaches and dizziness.    Orders:  -     ECG 12 Lead  -     Stress Test With Myocardial Perfusion One Day; Future  -     Adult Transthoracic Echo Complete W/ Cont if Necessary Per Protocol; Future  -     Duplex Carotid Ultrasound CAR; Future      Recommendations: ER if symptoms increase, Limitations of stress testing for definitive diagnosis reviewed, Sleep hygiene discussed, Limit salt, Limit exercise, Stop cigarettes, Limit caffeine and Report if any new/changing symptoms immediately          Follow Up  Return for after testing.  Patient was given instructions and counseling regarding her condition or for health maintenance advice. Please see specific information pulled into the AVS if appropriate.

## 2023-04-11 ENCOUNTER — OUTSIDE FACILITY SERVICE (OUTPATIENT)
Dept: CARDIOLOGY | Facility: CLINIC | Age: 73
End: 2023-04-11
Payer: MEDICARE

## 2023-04-11 PROCEDURE — 93016 CV STRESS TEST SUPVJ ONLY: CPT | Performed by: INTERNAL MEDICINE

## 2023-04-11 PROCEDURE — 78452 HT MUSCLE IMAGE SPECT MULT: CPT | Performed by: INTERNAL MEDICINE

## 2023-04-12 DIAGNOSIS — R07.89 CHEST PAIN, ATYPICAL: ICD-10-CM

## 2023-04-12 DIAGNOSIS — R42 DIZZINESS: ICD-10-CM

## 2023-04-14 ENCOUNTER — OFFICE VISIT (OUTPATIENT)
Dept: CARDIOLOGY | Facility: CLINIC | Age: 73
End: 2023-04-14
Payer: MEDICARE

## 2023-04-14 VITALS
SYSTOLIC BLOOD PRESSURE: 140 MMHG | HEIGHT: 65 IN | BODY MASS INDEX: 28.99 KG/M2 | WEIGHT: 174 LBS | DIASTOLIC BLOOD PRESSURE: 70 MMHG | OXYGEN SATURATION: 97 % | HEART RATE: 76 BPM

## 2023-04-14 DIAGNOSIS — R42 DIZZINESS: ICD-10-CM

## 2023-04-14 DIAGNOSIS — R94.39 ABNORMAL STRESS TEST: ICD-10-CM

## 2023-04-14 DIAGNOSIS — R07.89 CHEST PAIN, ATYPICAL: ICD-10-CM

## 2023-04-14 RX ORDER — METHOCARBAMOL 750 MG/1
50000 TABLET ORAL
COMMUNITY
Start: 2023-04-08

## 2023-04-14 RX ORDER — LIDOCAINE 50 MG/G
1 PATCH TOPICAL DAILY
COMMUNITY
Start: 2023-03-31

## 2023-04-14 NOTE — ASSESSMENT & PLAN NOTE
She reports that she is feeling extreme fatigue.  She will be sitting watching TV and fall asleep in the entire she will be over.  She said I could turn the lights off in the exam room and she could lay down and take a long nap.  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  She is going to consider doing another trial as this was 40 years ago.  I told her we could even update her sleep study to see where her sleep apnea is at now. Declines repeating home sleep study or do another trial of CPAP at this time

## 2023-04-14 NOTE — ASSESSMENT & PLAN NOTE
She is complaining of new chest pain and dizziness for the last couple months.  It happens 3-4 times a week since late last year.  It is a squeezing feeling.  She sees physical therapist and they tried to release it, since that did not work it made her feel like it could possibly be cardiac instead of muscular. Chest pain is still present.  She did have a normal heart cath in August 2020.    We did a nuclear stress test and it is intermediate risk.  She is unwilling to do a trial of beta blocker due to reading they can increase her fatigue.  EKG sinus rhythm with possible left atrial enlargement and possible septal infarct.     April 11, 2023 nuclear stress test raw imaging showed no unusual uptake.  EF greater than 70%.  Perfusion imaging showed an anterior moderate in size reversible defect.  Summed stress score was 10 and summed difference score was 10.  Intermediate risk of study.    March 28, 2023 echo overall normal study    March 28, 2023 bilateral carotid duplex less than than 50% bilaterally    We discussed heart cath versus coronary CTA.  Patient would like to move forward with a heart cath.  Since she did have a normal heart cath in August 2020 and she is not on antianginals, we can order a coronary CTA if insurance will not approve a heart cath.

## 2023-04-14 NOTE — H&P (VIEW-ONLY)
[facility] Provider Note     Date:   2023   Name: Camila Mayberry  :   1950  PCP: Travis Linton MD    Chief Complaint   Patient presents with   • Chest Pain       Subjective     History of Present Illnes  Camila Mayberry is a 72 y.o. female who presents today to follow-up on chest pain and cardiac testing.  She reports that she is feeling extreme fatigue.  She will be sitting watching TV and fall asleep in the entire she will be over.  She said I could turn the lights off in the exam room and she could lay down and take a long nap.  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  She is going to consider doing another trial as this was 40 years ago.  I told her we could even update her sleep study to see where her sleep apnea is at now. Declines repeating home sleep study or do another trial of CPAP at this time    She also has 2 pain stimulators in place.  1 is not working.    She is complaining of new chest pain and dizziness for the last couple months.  It happens 3-4 times a week since late last year.  It is a squeezing feeling.  She sees physical therapist and they tried to release it, since that did not work it made her feel like it could possibly be cardiac instead of muscular. Chest pain is still present.  She did have a normal heart cath in 2020.    She has coexisting migraines and complex regional pain syndrome in her lower extremities.  She is going to follow-up with Dr. Priest in [facility] soon she saw ENT but they think her dizziness issues is related to migraines.    We did a nuclear stress test and it is intermediate risk.  She is unwilling to do a trial of beta blocker due to reading they can increase her fatigue.  EKG sinus rhythm with possible left atrial enlargement and possible septal infarct.     2023 nuclear stress test raw imaging showed no unusual uptake.  EF greater than 70%.   Perfusion imaging showed an anterior moderate in size reversible defect.  Summed stress score was 10 and summed difference score was 10.  Intermediate risk of study.    March 28, 2023 echo overall normal study    March 28, 2023 bilateral carotid duplex less than than 50% bilaterally    We discussed heart cath versus coronary CTA.  Patient would like to move forward with a heart cath.  Since she did have a normal heart cath in August 2020 and she is not on antianginals, we can order a coronary CTA if insurance will not approve a heart cath.       Reports Denies   Chest Pain [x] []   Shortness of Air [x] []   Palpitations [] [x]   Edema [x] []   Dizziness [x] []   Syncope [] [x]     Allergies   Allergen Reactions   • Desipramine Hcl Hives   • Imitrex [Sumatriptan] Hives   • Adhesive Tape Rash   • Codeine Nausea Only   • Erythromycin Nausea And Vomiting   • Penicillins Rash   • Wound Dressing Adhesive Unknown (See Comments)       Current Outpatient Medications:   •  lidocaine (LIDODERM) 5 %, Apply 1 patch topically to the appropriate area as directed Daily., Disp: , Rfl:   •  albuterol (2.5 MG/3ML) 0.083% nebulizer solution 3 mL, albuterol (5 MG/ML) 0.5% nebulizer solution 0.5 mL, Inhale Daily As Needed., Disp: , Rfl:   •  celecoxib (CeleBREX) 200 MG capsule, Daily., Disp: , Rfl:   •  Cholecalciferol (Vitamin D3) 1.25 MG (37127 UT) tablet, Take  by mouth., Disp: , Rfl:   •  D3-50 1.25 MG (03885 UT) capsule, Take 1 capsule by mouth Every 7 (Seven) Days., Disp: , Rfl:   •  diphenhydrAMINE (BENADRYL) 25 mg capsule, Take 1 capsule by mouth Every 6 (Six) Hours As Needed for Itching., Disp: , Rfl:   •  estradiol (ESTRACE) 0.1 MG/GM vaginal cream, USE 1 GRAM VAGINALLY EVERY NIGHT, Disp: , Rfl:   •  HYDROcodone-acetaminophen (NORCO)  MG per tablet, Take 1 tablet by mouth Every 6 (Six) Hours As Needed for Moderate Pain ., Disp: 25 tablet, Rfl: 0  •  lidocaine (LIDODERM) 5 %, Place 1 patch on the skin as directed by  "provider Daily As Needed for Mild Pain. Remove & Discard patch within 12 hours or as directed by MD, Disp: , Rfl:   •  Narcan 4 MG/0.1ML nasal spray, ADMINISTER 1 SPRAY INTO NOSTRIL AS NEEDED FOR OPIOID OVERDOSE REVERSAL. CALL 911. REPEAT EVERY 2 TO 3 MINUTES AS NEEDED ALTERNATING NOSTRILS, Disp: , Rfl:   •  pantoprazole (PROTONIX) 40 MG EC tablet, Take 1 tablet by mouth Before Breakfast., Disp: , Rfl:   •  rizatriptan MLT (MAXALT-MLT) 10 MG disintegrating tablet, Take 1 tablet by mouth 1 (One) Time As Needed for Migraine. May repeat in 2 hours if needed, Disp: , Rfl:   •  sucralfate (CARAFATE) 1 g tablet, Take 1 tablet by mouth 3 (Three) Times a Day As Needed., Disp: , Rfl:   •  topiramate (TOPAMAX) 100 MG tablet, Take 1 tablet by mouth 2 (Two) Times a Day., Disp: , Rfl:   •  traZODone (DESYREL) 100 MG tablet, Take 1 tablet by mouth every night at bedtime., Disp: , Rfl:   No current facility-administered medications for this visit.    Facility-Administered Medications Ordered in Other Visits:   •  mupirocin (BACTROBAN) 2 % nasal ointment, , Nasal, BID, Stephon Arriola MD    Past Medical History:   Diagnosis Date   • Anxiety     PT DENIES   • Arthritis    • Asthma    • Back problem    • Complex regional pain syndrome I    • CRPS (complex regional pain syndrome), lower limb    • Exertional asthma    • Hard to intubate     Pt states she was told, \"opening is like a child's so they had to use a child's tube on me\"   • Headache    • History of diabetes mellitus     Resolved after weight loss and diet change   • Long term prescription opiate use    • Migraine    • DEANDRE (obstructive sleep apnea)     MILD; OFF TX   • Osteoporosis    • SOB (shortness of breath)    • Spinal headache       Past Surgical History:   Procedure Laterality Date   • ANTERIOR CERVICAL DISCECTOMY W/ FUSION  05/15/2009    ACDF C6/7 (Dr. Arriola)   • ANTERIOR CERVICAL DISCECTOMY W/ FUSION N/A 02/14/2022    Procedure: CERVICAL DISCECTOMY ANTERIOR WITH " "FUSION C5-6;  Surgeon: Stephon Arriola MD;  Location:  [facility];  Service: Neurosurgery;  Laterality: N/A;   • APPENDECTOMY     • CARDIAC CATHETERIZATION N/A 08/11/2020    NO STENTS -- Procedure: Left Heart Cath;  Surgeon: Tim Quiroz MD;  Location:  [facility];  Service: Cardiovascular;  Laterality: N/A;   • CATARACT EXTRACTION  1996/1998   • CHOLECYSTECTOMY     • COLONOSCOPY     • FOOT SURGERY  2009   • HYSTERECTOMY      AGE 37   • SPINAL CORD STIMULATOR IMPLANT  2013    [employer] (Dr. Barr, [place])     Family History   Problem Relation Age of Onset   • Breast cancer Mother 59   • Cancer Father         PROSTATE   • Hyperlipidemia Sister    • Hypertension Sister    • Breast cancer Sister 30   • Hypertension Brother    • Arthritis Brother    • Hypertension Brother    • Diabetes Brother    • Heart attack Brother    • Arthritis Brother    • Arthritis Son    • Ovarian cancer Neg Hx      Social History     Socioeconomic History   • Marital status:    • Number of children: 3   Tobacco Use   • Smoking status: Never   • Smokeless tobacco: Never   Vaping Use   • Vaping Use: Never used   Substance and Sexual Activity   • Alcohol use: No   • Drug use: No   • Sexual activity: Defer       Objective     Vital Signs:  /70   Pulse 76   Ht 165.1 cm (65\")   Wt 78.9 kg (174 lb)   SpO2 97%   BMI 28.96 kg/m²   Estimated body mass index is 28.96 kg/m² as calculated from the following:    Height as of this encounter: 165.1 cm (65\").    Weight as of this encounter: 78.9 kg (174 lb).             Physical Exam  Vitals reviewed.   Eyes:      Pupils: Pupils are equal, round, and reactive to light.   Cardiovascular:      Rate and Rhythm: Normal rate and regular rhythm.      Heart sounds: Normal heart sounds.   Pulmonary:      Effort: Pulmonary effort is normal.   Skin:     General: Skin is warm and dry.   Neurological:      Mental Status: She is alert and oriented to person, place, and time. "   Psychiatric:         Mood and Affect: Mood normal.         Thought Content: Thought content normal.         Judgment: Judgment normal.                       Assessment and Plan     Diagnoses and all orders for this visit:    1. Dizziness (Primary)  Assessment & Plan:  She has coexisting migraines and complex regional pain syndrome in her lower extremities.  She is going to follow-up with Dr. Priest in ENT soon she saw ENT but they think her dizziness issues is related to migraines.    We did a nuclear stress test and it is intermediate risk.  She is unwilling to do a trial of beta blocker due to reading they can increase her fatigue.  EKG sinus rhythm with possible left atrial enlargement and possible septal infarct.     April 11, 2023 nuclear stress test raw imaging showed no unusual uptake.  EF greater than 70%.  Perfusion imaging showed an anterior moderate in size reversible defect.  Summed stress score was 10 and summed difference score was 10.  Intermediate risk of study.    March 28, 2023 echo overall normal study    March 28, 2023 bilateral carotid duplex less than than 50% bilaterally    We discussed heart cath versus coronary CTA.  Patient would like to move forward with a heart cath.  Since she did have a normal heart cath in August 2020 and she is not on antianginals, we can order a coronary CTA if insurance will not approve a heart cath.      2. Abnormal stress test  Assessment & Plan:  April 11, 2023 nuclear stress test raw imaging showed no unusual uptake.  EF greater than 70%.  Perfusion imaging showed an anterior moderate in size reversible defect.  Summed stress score was 10 and summed difference score was 10.  Intermediate risk of study.    Orders:  -     Case Request Cath Lab: Cardiac Catheterization/Vascular Study; Standing  -     Case Request Cath Lab: Cardiac Catheterization/Vascular Study    3. Chest pain, atypical  Assessment & Plan:  She is complaining of new chest pain and dizziness for  the last couple months.  It happens 3-4 times a week since late last year.  It is a squeezing feeling.  She sees physical therapist and they tried to release it, since that did not work it made her feel like it could possibly be cardiac instead of muscular. Chest pain is still present.  She did have a normal heart cath in August 2020.    We did a nuclear stress test and it is intermediate risk.  She is unwilling to do a trial of beta blocker due to reading they can increase her fatigue.  EKG sinus rhythm with possible left atrial enlargement and possible septal infarct.     April 11, 2023 nuclear stress test raw imaging showed no unusual uptake.  EF greater than 70%.  Perfusion imaging showed an anterior moderate in size reversible defect.  Summed stress score was 10 and summed difference score was 10.  Intermediate risk of study.    March 28, 2023 echo overall normal study    March 28, 2023 bilateral carotid duplex less than than 50% bilaterally    We discussed heart cath versus coronary CTA.  Patient would like to move forward with a heart cath.  Since she did have a normal heart cath in August 2020 and she is not on antianginals, we can order a coronary CTA if insurance will not approve a heart cath.    Orders:  -     Case Request Cath Lab: Cardiac Catheterization/Vascular Study; Standing  -     Case Request Cath Lab: Cardiac Catheterization/Vascular Study      Recommendations: ER if symptoms increase, Limitations of stress testing for definitive diagnosis reviewed, Sleep hygiene discussed, Limit salt, Limit exercise, Stop cigarettes, Limit caffeine and Report if any new/changing symptoms immediately          Follow Up  Return for after heart cath.  Patient was given instructions and counseling regarding her condition or for health maintenance advice. Please see specific information pulled into the AVS if appropriate.

## 2023-04-14 NOTE — ASSESSMENT & PLAN NOTE
April 11, 2023 nuclear stress test raw imaging showed no unusual uptake.  EF greater than 70%.  Perfusion imaging showed an anterior moderate in size reversible defect.  Summed stress score was 10 and summed difference score was 10.  Intermediate risk of study.

## 2023-04-14 NOTE — PROGRESS NOTES
[facility] Provider Note     Date:   2023   Name: Camila Mayberry  :   1950  PCP: Travis Linton MD    Chief Complaint   Patient presents with   • Chest Pain       Subjective     History of Present Illnes  Camila Mayberry is a 72 y.o. female who presents today to follow-up on chest pain and cardiac testing.  She reports that she is feeling extreme fatigue.  She will be sitting watching TV and fall asleep in the entire she will be over.  She said I could turn the lights off in the exam room and she could lay down and take a long nap.  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  She is going to consider doing another trial as this was 40 years ago.  I told her we could even update her sleep study to see where her sleep apnea is at now. Declines repeating home sleep study or do another trial of CPAP at this time    She also has 2 pain stimulators in place.  1 is not working.    She is complaining of new chest pain and dizziness for the last couple months.  It happens 3-4 times a week since late last year.  It is a squeezing feeling.  She sees physical therapist and they tried to release it, since that did not work it made her feel like it could possibly be cardiac instead of muscular. Chest pain is still present.  She did have a normal heart cath in 2020.    She has coexisting migraines and complex regional pain syndrome in her lower extremities.  She is going to follow-up with Dr. Priest in ENT soon she saw ENT but they think her dizziness issues is related to migraines.    We did a nuclear stress test and it is intermediate risk.  She is unwilling to do a trial of beta blocker due to reading they can increase her fatigue.  EKG sinus rhythm with possible left atrial enlargement and possible septal infarct.     2023 nuclear stress test raw imaging showed no unusual uptake.  EF greater than 70%.   Perfusion imaging showed an anterior moderate in size reversible defect.  Summed stress score was 10 and summed difference score was 10.  Intermediate risk of study.    March 28, 2023 echo overall normal study    March 28, 2023 bilateral carotid duplex less than than 50% bilaterally    We discussed heart cath versus coronary CTA.  Patient would like to move forward with a heart cath.  Since she did have a normal heart cath in August 2020 and she is not on antianginals, we can order a coronary CTA if insurance will not approve a heart cath.       Reports Denies   Chest Pain [x] []   Shortness of Air [x] []   Palpitations [] [x]   Edema [x] []   Dizziness [x] []   Syncope [] [x]     Allergies   Allergen Reactions   • Desipramine Hcl Hives   • Imitrex [Sumatriptan] Hives   • Adhesive Tape Rash   • Codeine Nausea Only   • Erythromycin Nausea And Vomiting   • Penicillins Rash   • Wound Dressing Adhesive Unknown (See Comments)       Current Outpatient Medications:   •  lidocaine (LIDODERM) 5 %, Apply 1 patch topically to the appropriate area as directed Daily., Disp: , Rfl:   •  albuterol (2.5 MG/3ML) 0.083% nebulizer solution 3 mL, albuterol (5 MG/ML) 0.5% nebulizer solution 0.5 mL, Inhale Daily As Needed., Disp: , Rfl:   •  celecoxib (CeleBREX) 200 MG capsule, Daily., Disp: , Rfl:   •  Cholecalciferol (Vitamin D3) 1.25 MG (78158 UT) tablet, Take  by mouth., Disp: , Rfl:   •  D3-50 1.25 MG (43785 UT) capsule, Take 1 capsule by mouth Every 7 (Seven) Days., Disp: , Rfl:   •  diphenhydrAMINE (BENADRYL) 25 mg capsule, Take 1 capsule by mouth Every 6 (Six) Hours As Needed for Itching., Disp: , Rfl:   •  estradiol (ESTRACE) 0.1 MG/GM vaginal cream, USE 1 GRAM VAGINALLY EVERY NIGHT, Disp: , Rfl:   •  HYDROcodone-acetaminophen (NORCO)  MG per tablet, Take 1 tablet by mouth Every 6 (Six) Hours As Needed for Moderate Pain ., Disp: 25 tablet, Rfl: 0  •  lidocaine (LIDODERM) 5 %, Place 1 patch on the skin as directed by  "provider Daily As Needed for Mild Pain. Remove & Discard patch within 12 hours or as directed by MD, Disp: , Rfl:   •  Narcan 4 MG/0.1ML nasal spray, ADMINISTER 1 SPRAY INTO NOSTRIL AS NEEDED FOR OPIOID OVERDOSE REVERSAL. CALL 911. REPEAT EVERY 2 TO 3 MINUTES AS NEEDED ALTERNATING NOSTRILS, Disp: , Rfl:   •  pantoprazole (PROTONIX) 40 MG EC tablet, Take 1 tablet by mouth Before Breakfast., Disp: , Rfl:   •  rizatriptan MLT (MAXALT-MLT) 10 MG disintegrating tablet, Take 1 tablet by mouth 1 (One) Time As Needed for Migraine. May repeat in 2 hours if needed, Disp: , Rfl:   •  sucralfate (CARAFATE) 1 g tablet, Take 1 tablet by mouth 3 (Three) Times a Day As Needed., Disp: , Rfl:   •  topiramate (TOPAMAX) 100 MG tablet, Take 1 tablet by mouth 2 (Two) Times a Day., Disp: , Rfl:   •  traZODone (DESYREL) 100 MG tablet, Take 1 tablet by mouth every night at bedtime., Disp: , Rfl:   No current facility-administered medications for this visit.    Facility-Administered Medications Ordered in Other Visits:   •  mupirocin (BACTROBAN) 2 % nasal ointment, , Nasal, BID, Stephon Arriola MD    Past Medical History:   Diagnosis Date   • Anxiety     PT DENIES   • Arthritis    • Asthma    • Back problem    • Complex regional pain syndrome I    • CRPS (complex regional pain syndrome), lower limb    • Exertional asthma    • Hard to intubate     Pt states she was told, \"opening is like a child's so they had to use a child's tube on me\"   • Headache    • History of diabetes mellitus     Resolved after weight loss and diet change   • Long term prescription opiate use    • Migraine    • DEANDRE (obstructive sleep apnea)     MILD; OFF TX   • Osteoporosis    • SOB (shortness of breath)    • Spinal headache       Past Surgical History:   Procedure Laterality Date   • ANTERIOR CERVICAL DISCECTOMY W/ FUSION  05/15/2009    ACDF C6/7 (Dr. Arriola)   • ANTERIOR CERVICAL DISCECTOMY W/ FUSION N/A 02/14/2022    Procedure: CERVICAL DISCECTOMY ANTERIOR WITH " "FUSION C5-6;  Surgeon: Stephon Arriola MD;  Location:  [facility];  Service: Neurosurgery;  Laterality: N/A;   • APPENDECTOMY     • CARDIAC CATHETERIZATION N/A 08/11/2020    NO STENTS -- Procedure: Left Heart Cath;  Surgeon: Tim Quiorz MD;  Location:  [facility];  Service: Cardiovascular;  Laterality: N/A;   • CATARACT EXTRACTION  1996/1998   • CHOLECYSTECTOMY     • COLONOSCOPY     • FOOT SURGERY  2009   • HYSTERECTOMY      AGE 37   • SPINAL CORD STIMULATOR IMPLANT  2013    [employer] (Dr. Barr, [place])     Family History   Problem Relation Age of Onset   • Breast cancer Mother 59   • Cancer Father         PROSTATE   • Hyperlipidemia Sister    • Hypertension Sister    • Breast cancer Sister 30   • Hypertension Brother    • Arthritis Brother    • Hypertension Brother    • Diabetes Brother    • Heart attack Brother    • Arthritis Brother    • Arthritis Son    • Ovarian cancer Neg Hx      Social History     Socioeconomic History   • Marital status:    • Number of children: 3   Tobacco Use   • Smoking status: Never   • Smokeless tobacco: Never   Vaping Use   • Vaping Use: Never used   Substance and Sexual Activity   • Alcohol use: No   • Drug use: No   • Sexual activity: Defer       Objective     Vital Signs:  /70   Pulse 76   Ht 165.1 cm (65\")   Wt 78.9 kg (174 lb)   SpO2 97%   BMI 28.96 kg/m²   Estimated body mass index is 28.96 kg/m² as calculated from the following:    Height as of this encounter: 165.1 cm (65\").    Weight as of this encounter: 78.9 kg (174 lb).             Physical Exam  Vitals reviewed.   Eyes:      Pupils: Pupils are equal, round, and reactive to light.   Cardiovascular:      Rate and Rhythm: Normal rate and regular rhythm.      Heart sounds: Normal heart sounds.   Pulmonary:      Effort: Pulmonary effort is normal.   Skin:     General: Skin is warm and dry.   Neurological:      Mental Status: She is alert and oriented to person, place, and time. "   Psychiatric:         Mood and Affect: Mood normal.         Thought Content: Thought content normal.         Judgment: Judgment normal.                       Assessment and Plan     Diagnoses and all orders for this visit:    1. Dizziness (Primary)  Assessment & Plan:  She has coexisting migraines and complex regional pain syndrome in her lower extremities.  She is going to follow-up with Dr. Priest in ENT soon she saw ENT but they think her dizziness issues is related to migraines.    We did a nuclear stress test and it is intermediate risk.  She is unwilling to do a trial of beta blocker due to reading they can increase her fatigue.  EKG sinus rhythm with possible left atrial enlargement and possible septal infarct.     April 11, 2023 nuclear stress test raw imaging showed no unusual uptake.  EF greater than 70%.  Perfusion imaging showed an anterior moderate in size reversible defect.  Summed stress score was 10 and summed difference score was 10.  Intermediate risk of study.    March 28, 2023 echo overall normal study    March 28, 2023 bilateral carotid duplex less than than 50% bilaterally    We discussed heart cath versus coronary CTA.  Patient would like to move forward with a heart cath.  Since she did have a normal heart cath in August 2020 and she is not on antianginals, we can order a coronary CTA if insurance will not approve a heart cath.      2. Abnormal stress test  Assessment & Plan:  April 11, 2023 nuclear stress test raw imaging showed no unusual uptake.  EF greater than 70%.  Perfusion imaging showed an anterior moderate in size reversible defect.  Summed stress score was 10 and summed difference score was 10.  Intermediate risk of study.    Orders:  -     Case Request Cath Lab: Cardiac Catheterization/Vascular Study; Standing  -     Case Request Cath Lab: Cardiac Catheterization/Vascular Study    3. Chest pain, atypical  Assessment & Plan:  She is complaining of new chest pain and dizziness for  the last couple months.  It happens 3-4 times a week since late last year.  It is a squeezing feeling.  She sees physical therapist and they tried to release it, since that did not work it made her feel like it could possibly be cardiac instead of muscular. Chest pain is still present.  She did have a normal heart cath in August 2020.    We did a nuclear stress test and it is intermediate risk.  She is unwilling to do a trial of beta blocker due to reading they can increase her fatigue.  EKG sinus rhythm with possible left atrial enlargement and possible septal infarct.     April 11, 2023 nuclear stress test raw imaging showed no unusual uptake.  EF greater than 70%.  Perfusion imaging showed an anterior moderate in size reversible defect.  Summed stress score was 10 and summed difference score was 10.  Intermediate risk of study.    March 28, 2023 echo overall normal study    March 28, 2023 bilateral carotid duplex less than than 50% bilaterally    We discussed heart cath versus coronary CTA.  Patient would like to move forward with a heart cath.  Since she did have a normal heart cath in August 2020 and she is not on antianginals, we can order a coronary CTA if insurance will not approve a heart cath.    Orders:  -     Case Request Cath Lab: Cardiac Catheterization/Vascular Study; Standing  -     Case Request Cath Lab: Cardiac Catheterization/Vascular Study      Recommendations: ER if symptoms increase, Limitations of stress testing for definitive diagnosis reviewed, Sleep hygiene discussed, Limit salt, Limit exercise, Stop cigarettes, Limit caffeine and Report if any new/changing symptoms immediately          Follow Up  Return for after heart cath.  Patient was given instructions and counseling regarding her condition or for health maintenance advice. Please see specific information pulled into the AVS if appropriate.

## 2023-04-14 NOTE — ASSESSMENT & PLAN NOTE
She has coexisting migraines and complex regional pain syndrome in her lower extremities.  She is going to follow-up with Dr. Priest in ENT soon she saw ENT but they think her dizziness issues is related to migraines.    We did a nuclear stress test and it is intermediate risk.  She is unwilling to do a trial of beta blocker due to reading they can increase her fatigue.  EKG sinus rhythm with possible left atrial enlargement and possible septal infarct.     April 11, 2023 nuclear stress test raw imaging showed no unusual uptake.  EF greater than 70%.  Perfusion imaging showed an anterior moderate in size reversible defect.  Summed stress score was 10 and summed difference score was 10.  Intermediate risk of study.    March 28, 2023 echo overall normal study    March 28, 2023 bilateral carotid duplex less than than 50% bilaterally    We discussed heart cath versus coronary CTA.  Patient would like to move forward with a heart cath.  Since she did have a normal heart cath in August 2020 and she is not on antianginals, we can order a coronary CTA if insurance will not approve a heart cath.

## 2023-04-20 NOTE — ASSESSMENT & PLAN NOTE
Intolerant to PAP therapy 40 years ago.  Plans to consider doing another trial of CPAP therapy or repeating sleep study.  
We will order carotids, echo, stress test.  Patient is also followed back up with neurology for headaches and dizziness.  
We will order echo and stress test  
No

## 2023-04-21 ENCOUNTER — HOSPITAL ENCOUNTER (OUTPATIENT)
Facility: HOSPITAL | Age: 73
Setting detail: HOSPITAL OUTPATIENT SURGERY
Discharge: HOME | End: 2023-04-21
Attending: INTERNAL MEDICINE | Admitting: INTERNAL MEDICINE
Payer: MEDICARE

## 2023-04-21 ENCOUNTER — APPOINTMENT (OUTPATIENT)
Dept: GENERAL RADIOLOGY | Facility: HOSPITAL | Age: 73
End: 2023-04-21
Payer: MEDICARE

## 2023-04-21 VITALS
BODY MASS INDEX: 28.66 KG/M2 | HEIGHT: 65 IN | WEIGHT: 172 LBS | OXYGEN SATURATION: 93 % | HEART RATE: 75 BPM | RESPIRATION RATE: 18 BRPM | TEMPERATURE: 97.5 F | DIASTOLIC BLOOD PRESSURE: 77 MMHG | SYSTOLIC BLOOD PRESSURE: 126 MMHG

## 2023-04-21 DIAGNOSIS — R94.39 ABNORMAL STRESS TEST: ICD-10-CM

## 2023-04-21 DIAGNOSIS — R07.89 CHEST PAIN, ATYPICAL: ICD-10-CM

## 2023-04-21 LAB
ALBUMIN SERPL-MCNC: 4.3 G/DL (ref 3.5–5.2)
ALBUMIN/GLOB SERPL: 1.5 G/DL
ALP SERPL-CCNC: 101 U/L (ref 39–117)
ALT SERPL W P-5'-P-CCNC: 12 U/L (ref 1–33)
ANION GAP SERPL CALCULATED.3IONS-SCNC: 13 MMOL/L (ref 5–15)
AST SERPL-CCNC: 18 U/L (ref 1–32)
BILIRUB SERPL-MCNC: 0.2 MG/DL (ref 0–1.2)
BUN SERPL-MCNC: 12 MG/DL (ref 8–23)
BUN/CREAT SERPL: 13.5 (ref 7–25)
CALCIUM SPEC-SCNC: 9.8 MG/DL (ref 8.6–10.5)
CATH EF ESTIMATED: 60 %
CHLORIDE SERPL-SCNC: 108 MMOL/L (ref 98–107)
CHOLEST SERPL-MCNC: 210 MG/DL (ref 0–200)
CO2 SERPL-SCNC: 22 MMOL/L (ref 22–29)
CREAT SERPL-MCNC: 0.89 MG/DL (ref 0.57–1)
DEPRECATED RDW RBC AUTO: 43.8 FL (ref 37–54)
EGFRCR SERPLBLD CKD-EPI 2021: 69.8 ML/MIN/1.73
ERYTHROCYTE [DISTWIDTH] IN BLOOD BY AUTOMATED COUNT: 13.3 % (ref 12.3–15.4)
GLOBULIN UR ELPH-MCNC: 2.9 GM/DL
GLUCOSE SERPL-MCNC: 88 MG/DL (ref 65–99)
HBA1C MFR BLD: 5.6 % (ref 4.8–5.6)
HCT VFR BLD AUTO: 39.3 % (ref 34–46.6)
HDLC SERPL-MCNC: 81 MG/DL (ref 40–60)
HGB BLD-MCNC: 12.5 G/DL (ref 12–15.9)
LDLC SERPL CALC-MCNC: 103 MG/DL (ref 0–100)
LDLC/HDLC SERPL: 1.21 {RATIO}
MCH RBC QN AUTO: 28.7 PG (ref 26.6–33)
MCHC RBC AUTO-ENTMCNC: 31.8 G/DL (ref 31.5–35.7)
MCV RBC AUTO: 90.1 FL (ref 79–97)
PLATELET # BLD AUTO: 236 10*3/MM3 (ref 140–450)
PMV BLD AUTO: 9.6 FL (ref 6–12)
POTASSIUM SERPL-SCNC: 3.9 MMOL/L (ref 3.5–5.2)
PROT SERPL-MCNC: 7.2 G/DL (ref 6–8.5)
RBC # BLD AUTO: 4.36 10*6/MM3 (ref 3.77–5.28)
SODIUM SERPL-SCNC: 143 MMOL/L (ref 136–145)
TRIGL SERPL-MCNC: 156 MG/DL (ref 0–150)
VLDLC SERPL-MCNC: 26 MG/DL (ref 5–40)
WBC NRBC COR # BLD: 7.1 10*3/MM3 (ref 3.4–10.8)

## 2023-04-21 PROCEDURE — 83036 HEMOGLOBIN GLYCOSYLATED A1C: CPT | Performed by: NURSE PRACTITIONER

## 2023-04-21 PROCEDURE — 85027 COMPLETE CBC AUTOMATED: CPT | Performed by: NURSE PRACTITIONER

## 2023-04-21 PROCEDURE — 25010000002 MIDAZOLAM PER 1 MG: Performed by: INTERNAL MEDICINE

## 2023-04-21 PROCEDURE — 80061 LIPID PANEL: CPT | Performed by: NURSE PRACTITIONER

## 2023-04-21 PROCEDURE — 25010000002 FENTANYL CITRATE (PF) 50 MCG/ML SOLUTION: Performed by: INTERNAL MEDICINE

## 2023-04-21 PROCEDURE — 25010000002 HEPARIN (PORCINE) PER 1000 UNITS: Performed by: INTERNAL MEDICINE

## 2023-04-21 PROCEDURE — C1769 GUIDE WIRE: HCPCS | Performed by: INTERNAL MEDICINE

## 2023-04-21 PROCEDURE — 93458 L HRT ARTERY/VENTRICLE ANGIO: CPT | Performed by: INTERNAL MEDICINE

## 2023-04-21 PROCEDURE — C1894 INTRO/SHEATH, NON-LASER: HCPCS | Performed by: INTERNAL MEDICINE

## 2023-04-21 PROCEDURE — 25510000001 IOPAMIDOL PER 1 ML: Performed by: INTERNAL MEDICINE

## 2023-04-21 PROCEDURE — 80053 COMPREHEN METABOLIC PANEL: CPT | Performed by: NURSE PRACTITIONER

## 2023-04-21 PROCEDURE — 71045 X-RAY EXAM CHEST 1 VIEW: CPT

## 2023-04-21 PROCEDURE — 99152 MOD SED SAME PHYS/QHP 5/>YRS: CPT | Performed by: INTERNAL MEDICINE

## 2023-04-21 RX ORDER — SODIUM CHLORIDE 0.9 % (FLUSH) 0.9 %
10 SYRINGE (ML) INJECTION EVERY 12 HOURS SCHEDULED
Status: DISCONTINUED | OUTPATIENT
Start: 2023-04-21 | End: 2023-04-21 | Stop reason: HOSPADM

## 2023-04-21 RX ORDER — NICARDIPINE HCL-0.9% SOD CHLOR 1 MG/10 ML
SYRINGE (ML) INTRAVENOUS
Status: DISCONTINUED | OUTPATIENT
Start: 2023-04-21 | End: 2023-04-21 | Stop reason: HOSPADM

## 2023-04-21 RX ORDER — SODIUM CHLORIDE 9 MG/ML
40 INJECTION, SOLUTION INTRAVENOUS AS NEEDED
Status: DISCONTINUED | OUTPATIENT
Start: 2023-04-21 | End: 2023-04-21 | Stop reason: HOSPADM

## 2023-04-21 RX ORDER — MIDAZOLAM HYDROCHLORIDE 1 MG/ML
INJECTION INTRAMUSCULAR; INTRAVENOUS
Status: DISCONTINUED | OUTPATIENT
Start: 2023-04-21 | End: 2023-04-21 | Stop reason: HOSPADM

## 2023-04-21 RX ORDER — FENTANYL CITRATE 50 UG/ML
INJECTION, SOLUTION INTRAMUSCULAR; INTRAVENOUS
Status: DISCONTINUED | OUTPATIENT
Start: 2023-04-21 | End: 2023-04-21 | Stop reason: HOSPADM

## 2023-04-21 RX ORDER — ASPIRIN 325 MG
325 TABLET ORAL ONCE
Status: COMPLETED | OUTPATIENT
Start: 2023-04-21 | End: 2023-04-21

## 2023-04-21 RX ORDER — ATORVASTATIN CALCIUM 40 MG/1
40 TABLET, FILM COATED ORAL DAILY
COMMUNITY

## 2023-04-21 RX ORDER — ONDANSETRON 2 MG/ML
4 INJECTION INTRAMUSCULAR; INTRAVENOUS EVERY 6 HOURS PRN
Status: DISCONTINUED | OUTPATIENT
Start: 2023-04-21 | End: 2023-04-21 | Stop reason: HOSPADM

## 2023-04-21 RX ORDER — SODIUM CHLORIDE 0.9 % (FLUSH) 0.9 %
1-10 SYRINGE (ML) INJECTION AS NEEDED
Status: DISCONTINUED | OUTPATIENT
Start: 2023-04-21 | End: 2023-04-21 | Stop reason: HOSPADM

## 2023-04-21 RX ORDER — NITROGLYCERIN 0.4 MG/1
0.4 TABLET SUBLINGUAL
Status: DISCONTINUED | OUTPATIENT
Start: 2023-04-21 | End: 2023-04-21 | Stop reason: HOSPADM

## 2023-04-21 RX ORDER — SODIUM CHLORIDE 9 MG/ML
1-3 INJECTION, SOLUTION INTRAVENOUS CONTINUOUS
Status: DISCONTINUED | OUTPATIENT
Start: 2023-04-21 | End: 2023-04-21 | Stop reason: HOSPADM

## 2023-04-21 RX ORDER — ASPIRIN 325 MG
325 TABLET, DELAYED RELEASE (ENTERIC COATED) ORAL DAILY
Status: DISCONTINUED | OUTPATIENT
Start: 2023-04-22 | End: 2023-04-21 | Stop reason: HOSPADM

## 2023-04-21 RX ORDER — LIDOCAINE HYDROCHLORIDE 10 MG/ML
INJECTION, SOLUTION EPIDURAL; INFILTRATION; INTRACAUDAL; PERINEURAL
Status: DISCONTINUED | OUTPATIENT
Start: 2023-04-21 | End: 2023-04-21 | Stop reason: HOSPADM

## 2023-04-21 RX ORDER — HEPARIN SODIUM 1000 [USP'U]/ML
INJECTION, SOLUTION INTRAVENOUS; SUBCUTANEOUS
Status: DISCONTINUED | OUTPATIENT
Start: 2023-04-21 | End: 2023-04-21 | Stop reason: HOSPADM

## 2023-04-21 RX ORDER — ACETAMINOPHEN 325 MG/1
650 TABLET ORAL EVERY 4 HOURS PRN
Status: DISCONTINUED | OUTPATIENT
Start: 2023-04-21 | End: 2023-04-21 | Stop reason: HOSPADM

## 2023-04-21 RX ADMIN — SODIUM CHLORIDE 3 ML/KG/HR: 9 INJECTION, SOLUTION INTRAVENOUS at 10:02

## 2023-04-21 RX ADMIN — ASPIRIN 325 MG: 325 TABLET ORAL at 10:02

## 2023-04-21 NOTE — Clinical Note
Hemostasis started on the right radial artery. R-Band was used in achieving hemostasis. Radial compression device applied to vessel. Hemostasis achieved successfully. Closure device additional comment: TR- 12 ML

## 2023-05-22 ENCOUNTER — HOSPITAL ENCOUNTER (OUTPATIENT)
Dept: GENERAL RADIOLOGY | Facility: HOSPITAL | Age: 73
Discharge: HOME | End: 2023-05-22
Admitting: PHYSICIAN ASSISTANT
Payer: MEDICARE

## 2023-05-22 DIAGNOSIS — Z98.1 S/P CERVICAL SPINAL FUSION: ICD-10-CM

## 2023-05-22 PROCEDURE — 72040 X-RAY EXAM NECK SPINE 2-3 VW: CPT

## 2023-05-23 ENCOUNTER — OFFICE VISIT (OUTPATIENT)
Dept: NEUROSURGERY | Facility: CLINIC | Age: 73
End: 2023-05-23
Payer: MEDICARE

## 2023-05-23 VITALS
TEMPERATURE: 98.4 F | HEIGHT: 65 IN | WEIGHT: 171 LBS | HEART RATE: 76 BPM | BODY MASS INDEX: 28.49 KG/M2 | DIASTOLIC BLOOD PRESSURE: 76 MMHG | SYSTOLIC BLOOD PRESSURE: 150 MMHG

## 2023-05-23 DIAGNOSIS — Z96.89 SPINAL CORD STIMULATOR STATUS: ICD-10-CM

## 2023-05-23 DIAGNOSIS — M47.812 CERVICAL SPONDYLOSIS WITHOUT MYELOPATHY: Primary | ICD-10-CM

## 2023-05-23 PROCEDURE — 99213 OFFICE O/P EST LOW 20 MIN: CPT | Performed by: PHYSICIAN ASSISTANT

## 2023-05-23 RX ORDER — PREDNISONE 20 MG/1
TABLET ORAL
COMMUNITY
Start: 2023-05-19

## 2023-05-23 RX ORDER — TRAZODONE HYDROCHLORIDE 50 MG/1
TABLET ORAL
COMMUNITY

## 2023-05-23 RX ORDER — CEFDINIR 300 MG/1
1 CAPSULE ORAL EVERY 12 HOURS SCHEDULED
COMMUNITY
Start: 2023-05-19

## 2023-05-23 NOTE — PROGRESS NOTES
"Patient: Camila Mayberry  : 1950  Chart #: [id number]    Date of Service: 2023    CHIEF COMPLAINT:   1. Neck and left upper extremity pain  2.  Removal of old spinal cord stimulator    History of Present Illness Patient is a 72-year-old woman who remotely underwent ACDF at C6/7 utilizing a mystic plate.  More recently she presented with neck and left upper extremity pain and ultimately on 2022 she underwent ACDF at C5-6.  Her bone quality was felt to be poor so she wore a cervical collar for the first couple months postop.  Postoperatively she has done well.  She continues to go to physical therapy once a week helps with stiffness.    She presents today requesting removal of old spinal cord stimulator leads. The system was placed by Dr Barr about 7 years ago. She had the generator removed when she had a new stimulator placed by Dr. Reynoso 2 years ago-her new device is working well for her.  She wishes to have the old leads removed so that she can undergo MRIs.    Past Medical History:   Diagnosis Date   • Anxiety     PT DENIES   • Arthritis    • Asthma    • Back problem    • Cervical disc disorder    • Complex regional pain syndrome I    • CRPS (complex regional pain syndrome), lower limb    • Exertional asthma    • Hard to intubate     Pt states she was told, \"opening is like a child's so they had to use a child's tube on me\"   • Headache    • History of diabetes mellitus     Resolved after weight loss and diet change   • Long term prescription opiate use    • Low back pain    • Migraine    • DEANDRE (obstructive sleep apnea)     MILD; OFF TX   • Osteoporosis    • SOB (shortness of breath)    • Spinal headache    • Thoracic disc disorder  &          Current Outpatient Medications:   •  albuterol (2.5 MG/3ML) 0.083% nebulizer solution 3 mL, albuterol (5 MG/ML) 0.5% nebulizer solution 0.5 mL, Inhale Daily As Needed., Disp: , Rfl:   •  cefdinir (OMNICEF) 300 MG capsule, Take 1 " capsule by mouth Every 12 (Twelve) Hours., Disp: , Rfl:   •  celecoxib (CeleBREX) 200 MG capsule, Daily., Disp: , Rfl:   •  Cholecalciferol (Vitamin D3) 1.25 MG (43000 UT) tablet, Take  by mouth., Disp: , Rfl:   •  diphenhydrAMINE (BENADRYL) 25 mg capsule, Take 1 capsule by mouth Every 6 (Six) Hours As Needed for Itching., Disp: , Rfl:   •  HYDROcodone-acetaminophen (NORCO)  MG per tablet, Take 1 tablet by mouth Every 6 (Six) Hours As Needed for Moderate Pain ., Disp: 25 tablet, Rfl: 0  •  lidocaine (LIDODERM) 5 %, Apply 1 patch topically to the appropriate area as directed Daily., Disp: , Rfl:   •  Narcan 4 MG/0.1ML nasal spray, ADMINISTER 1 SPRAY INTO NOSTRIL AS NEEDED FOR OPIOID OVERDOSE REVERSAL. CALL 911. REPEAT EVERY 2 TO 3 MINUTES AS NEEDED ALTERNATING NOSTRILS, Disp: , Rfl:   •  predniSONE (DELTASONE) 20 MG tablet, , Disp: , Rfl:   •  rizatriptan MLT (MAXALT-MLT) 10 MG disintegrating tablet, Take 1 tablet by mouth 1 (One) Time As Needed for Migraine. May repeat in 2 hours if needed, Disp: , Rfl:   •  sucralfate (CARAFATE) 1 g tablet, Take 1 tablet by mouth 3 (Three) Times a Day As Needed., Disp: , Rfl:   •  topiramate (TOPAMAX) 100 MG tablet, Take 1 tablet by mouth 2 (Two) Times a Day., Disp: , Rfl:   •  traZODone (DESYREL) 50 MG tablet, trazodone 50 mg tablet  TAKE 1 TABLET BY MOUTH EVERY DAY, Disp: , Rfl:   No current facility-administered medications for this visit.    Facility-Administered Medications Ordered in Other Visits:   •  mupirocin (BACTROBAN) 2 % nasal ointment, , Nasal, BID, Stephon Arriola MD    Past Surgical History:   Procedure Laterality Date   • ANTERIOR CERVICAL DISCECTOMY W/ FUSION  05/15/2009    ACDF C6/7 (Dr. Arriola)   • ANTERIOR CERVICAL DISCECTOMY W/ FUSION N/A 02/14/2022    Procedure: CERVICAL DISCECTOMY ANTERIOR WITH FUSION C5-6;  Surgeon: Stephon Arriola MD;  Location: [facility];  Service: Neurosurgery;  Laterality: N/A;   • APPENDECTOMY     • BACK SURGERY  2012 & 2021     Stimulator placement   • CARDIAC CATHETERIZATION N/A 08/11/2020    NO STENTS -- Procedure: Left Heart Cath;  Surgeon: Tim Quiroz MD;  Location:  [facility];  Service: Cardiovascular;  Laterality: N/A;   • CARDIAC CATHETERIZATION Left 04/21/2023    Procedure: Cardiac Catheterization/Vascular Study;  Surgeon: Tim uQiroz MD;  Location:  [facility];  Service: Cardiovascular;  Laterality: Left;   • CATARACT EXTRACTION  1996/1998   • CHOLECYSTECTOMY     • COLONOSCOPY     • FOOT SURGERY  2009   • HYSTERECTOMY      AGE 37   • NECK SURGERY  2009   • SPINAL CORD STIMULATOR IMPLANT  2013    [employer] (Dr. Barr, [place])   • TRIGGER POINT INJECTION  2016?       Social History     Socioeconomic History   • Marital status:    • Number of children: 3   Tobacco Use   • Smoking status: Never   • Smokeless tobacco: Never   Vaping Use   • Vaping Use: Never used   Substance and Sexual Activity   • Alcohol use: No   • Drug use: No   • Sexual activity: Defer         Review of Systems   Constitutional: Negative for activity change, appetite change, chills, diaphoresis, fatigue, fever and unexpected weight change.   HENT: Negative for congestion, dental problem, drooling, ear discharge, ear pain, facial swelling, hearing loss, mouth sores, nosebleeds, postnasal drip, rhinorrhea, sinus pressure, sinus pain, sneezing, sore throat, tinnitus, trouble swallowing and voice change.    Eyes: Negative for photophobia, pain, discharge, redness, itching and visual disturbance.   Respiratory: Negative for apnea, cough, choking, chest tightness, shortness of breath, wheezing and stridor.    Cardiovascular: Negative for chest pain, palpitations and leg swelling.   Gastrointestinal: Negative for abdominal distention, abdominal pain, anal bleeding, blood in stool, constipation, diarrhea, nausea, rectal pain and vomiting.   Endocrine: Negative for cold intolerance, heat intolerance, polydipsia,  "polyphagia and polyuria.   Genitourinary: Negative for decreased urine volume, difficulty urinating, dyspareunia, dysuria, enuresis, flank pain, frequency, genital sores, hematuria, menstrual problem, pelvic pain, urgency, vaginal bleeding, vaginal discharge and vaginal pain.   Musculoskeletal: Negative for arthralgias, back pain, gait problem, joint swelling, myalgias, neck pain and neck stiffness.   Skin: Negative for color change, pallor, rash and wound.   Allergic/Immunologic: Negative for environmental allergies, food allergies and immunocompromised state.   Neurological: Negative for dizziness, tremors, seizures, syncope, facial asymmetry, speech difficulty, weakness, light-headedness, numbness and headaches.   Hematological: Negative for adenopathy. Does not bruise/bleed easily.   Psychiatric/Behavioral: Negative for agitation, behavioral problems, confusion, decreased concentration, dysphoric mood, hallucinations, self-injury, sleep disturbance and suicidal ideas. The patient is not nervous/anxious and is not hyperactive.        Objective   Vital Signs: Blood pressure 150/76, pulse 76, temperature 98.4 °F (36.9 °C), height 165.1 cm (65\"), weight 77.6 kg (171 lb), not currently breastfeeding.  Physical Exam  Vitals and nursing note reviewed.   Constitutional:       General: She is not in acute distress.     Appearance: She is well-developed.   HENT:      Head: Normocephalic and atraumatic.   Psychiatric:         Behavior: Behavior normal.         Thought Content: Thought content normal.   Musculoskeletal:     Strength is intact in upper and lower extremities to direct testing.  Neurologic:     Muscle tone is normal throughout.     Patient is oriented to person, place, and time.         Independent review of radiographic imaging: Plain films of the cervical spine demonstrate hardware intact at C5-6 with evidence of osseous fusion taking place.  There is postoperative changes from previous spinal fusion at " C6-7.    Assessment & Plan   Diagnosis:   1.  Cervical spondylosis with radiculopathy status post ACDF C5-6  2.  Dysfunctional spinal cord stimulator leads    Medical Decision Making: Patient is doing well in terms of her neck.  She wishes to have the old spinal cord stimulator leads removed so that she may undergo MRIs.  Her new spinal cord stimulator is MRI compatible.  I discussed this with Dr. Arriola.  He has recommended getting a set of x-rays to see where the leads are.  I will call patient once those are reviewed with further recommendations.      Diagnoses and all orders for this visit:    1. Cervical spondylosis without myelopathy (Primary)    2. Spinal cord stimulator status  -     XR Spine Thoracic 2 View; Future                      Alix Cortez PA-C  Patient Care Team:  Travis Linton MD as PCP - General (Family Medicine)  Thor Verduzco DO as Referring Physician (Anesthesiology)  Mckinley Gilliland MD as Consulting Physician (Gastroenterology)

## 2023-05-24 ENCOUNTER — OFFICE VISIT (OUTPATIENT)
Dept: CARDIOLOGY | Facility: CLINIC | Age: 73
End: 2023-05-24
Payer: MEDICARE

## 2023-05-24 VITALS
BODY MASS INDEX: 28.32 KG/M2 | OXYGEN SATURATION: 96 % | HEART RATE: 86 BPM | DIASTOLIC BLOOD PRESSURE: 64 MMHG | SYSTOLIC BLOOD PRESSURE: 126 MMHG | WEIGHT: 170 LBS | HEIGHT: 65 IN

## 2023-05-24 DIAGNOSIS — R06.02 SOB (SHORTNESS OF BREATH): ICD-10-CM

## 2023-05-24 DIAGNOSIS — R07.89 CHEST PAIN, ATYPICAL: Primary | ICD-10-CM

## 2023-05-24 NOTE — PROGRESS NOTES
[facility] Provider Note     Date:   2023   Name: Camila Mayberry  :   1950  PCP: Travis Linton MD    Chief Complaint   Patient presents with   • Chest Pain       Subjective     History of Present Illness  Camila Mayberry is a 72 y.o. female who presents today for follow-up on heart cath.  2023 heart cath with normal coronary arteries.  Patient continues to have shortness of air and dizziness.  She declines a home sleep study.  She is a Dr. Royer is ordering PFTs.  Patient to return to clinic as needed.      No specialty comments available.    Allergies   Allergen Reactions   • Desipramine Hcl Hives   • Imitrex [Sumatriptan] Hives   • Adhesive Tape Rash   • Codeine Nausea Only   • Erythromycin Nausea And Vomiting   • Penicillins Rash   • Wound Dressing Adhesive Unknown (See Comments)       Current Outpatient Medications:   •  albuterol (2.5 MG/3ML) 0.083% nebulizer solution 3 mL, albuterol (5 MG/ML) 0.5% nebulizer solution 0.5 mL, Inhale Daily As Needed., Disp: , Rfl:   •  cefdinir (OMNICEF) 300 MG capsule, Take 1 capsule by mouth Every 12 (Twelve) Hours., Disp: , Rfl:   •  celecoxib (CeleBREX) 200 MG capsule, Daily., Disp: , Rfl:   •  Cholecalciferol (Vitamin D3) 1.25 MG (41587 UT) tablet, Take  by mouth., Disp: , Rfl:   •  diphenhydrAMINE (BENADRYL) 25 mg capsule, Take 1 capsule by mouth Every 6 (Six) Hours As Needed for Itching., Disp: , Rfl:   •  HYDROcodone-acetaminophen (NORCO)  MG per tablet, Take 1 tablet by mouth Every 6 (Six) Hours As Needed for Moderate Pain ., Disp: 25 tablet, Rfl: 0  •  lidocaine (LIDODERM) 5 %, Apply 1 patch topically to the appropriate area as directed Daily., Disp: , Rfl:   •  Narcan 4 MG/0.1ML nasal spray, ADMINISTER 1 SPRAY INTO NOSTRIL AS NEEDED FOR OPIOID OVERDOSE REVERSAL. CALL 911. REPEAT EVERY 2 TO 3 MINUTES AS NEEDED ALTERNATING NOSTRILS, Disp: , Rfl:   •  predniSONE (DELTASONE) 20 MG tablet, , Disp: , Rfl:  "  •  rizatriptan MLT (MAXALT-MLT) 10 MG disintegrating tablet, Take 1 tablet by mouth 1 (One) Time As Needed for Migraine. May repeat in 2 hours if needed, Disp: , Rfl:   •  sucralfate (CARAFATE) 1 g tablet, Take 1 tablet by mouth 3 (Three) Times a Day As Needed., Disp: , Rfl:   •  topiramate (TOPAMAX) 100 MG tablet, Take 1 tablet by mouth 2 (Two) Times a Day., Disp: , Rfl:   •  traZODone (DESYREL) 50 MG tablet, trazodone 50 mg tablet  TAKE 1 TABLET BY MOUTH EVERY DAY, Disp: , Rfl:   No current facility-administered medications for this visit.    Facility-Administered Medications Ordered in Other Visits:   •  mupirocin (BACTROBAN) 2 % nasal ointment, , Nasal, BID, Stephon Arriola MD    Past Medical History:   Diagnosis Date   • Anxiety     PT DENIES   • Arthritis    • Asthma    • Back problem    • Cervical disc disorder 2008   • Complex regional pain syndrome I    • CRPS (complex regional pain syndrome), lower limb    • Exertional asthma    • Hard to intubate     Pt states she was told, \"opening is like a child's so they had to use a child's tube on me\"   • Headache    • History of diabetes mellitus     Resolved after weight loss and diet change   • Long term prescription opiate use    • Low back pain 2015   • Migraine    • DEANDRE (obstructive sleep apnea)     MILD; OFF TX   • Osteoporosis    • SOB (shortness of breath)    • Spinal headache    • Thoracic disc disorder 2009 & 2022      Past Surgical History:   Procedure Laterality Date   • ANTERIOR CERVICAL DISCECTOMY W/ FUSION  05/15/2009    ACDF C6/7 (Dr. Arriola)   • ANTERIOR CERVICAL DISCECTOMY W/ FUSION N/A 02/14/2022    Procedure: CERVICAL DISCECTOMY ANTERIOR WITH FUSION C5-6;  Surgeon: Stephon Arriola MD;  Location: [facility];  Service: Neurosurgery;  Laterality: N/A;   • APPENDECTOMY     • BACK SURGERY  2012 & 2021    Stimulator placement   • CARDIAC CATHETERIZATION N/A 08/11/2020    NO STENTS -- Procedure: Left Heart Cath;  Surgeon: Tim Quiroz MD;  " "Location:  [facility];  Service: Cardiovascular;  Laterality: N/A;   • CARDIAC CATHETERIZATION Left 04/21/2023    Procedure: Cardiac Catheterization/Vascular Study;  Surgeon: Tim Quiroz MD;  Location:  [facility];  Service: Cardiovascular;  Laterality: Left;   • CATARACT EXTRACTION  1996/1998   • CHOLECYSTECTOMY     • COLONOSCOPY     • FOOT SURGERY  2009   • HYSTERECTOMY      AGE 37   • NECK SURGERY  2009   • SPINAL CORD STIMUL[facility] IMPLANT  2013    [employer] [facility]   • TRIGGER POINT INJECTION  2016?     Family History   Problem Relation Age of Onset   • Breast cancer Mother 59   • Cancer Mother    • Thyroid disease Mother    • Cancer Father         PROSTATE   • Hearing loss Father    • Hyperlipidemia Sister    • Hypertension Sister    • Breast cancer Sister 30   • Hypertension Brother    • Arthritis Brother    • Diabetes Brother    • Hyperlipidemia Brother    • Hypertension Brother    • Diabetes Brother    • Heart attack Brother    • Arthritis Brother    • Arthritis Son    • Ovarian cancer Neg Hx      Social History     Socioeconomic History   • Marital status:    • Number of children: 3   Tobacco Use   • Smoking status: Never   • Smokeless tobacco: Never   Vaping Use   • Vaping Use: Never used   Substance and Sexual Activity   • Alcohol use: No   • Drug use: No   • Sexual activity: Defer       Objective     Vital Signs:  /64   Pulse 86   Ht 165.1 cm (65\")   Wt 77.1 kg (170 lb)   SpO2 96%   BMI 28.29 kg/m²   Estimated body mass index is 28.29 kg/m² as calculated from the following:    Height as of this encounter: 165.1 cm (65\").    Weight as of this encounter: 77.1 kg (170 lb).       BMI is >= 25 and <30. (Overweight) The following options were offered after discussion;: referral to primary care      Physical Exam  Vitals reviewed.   Cardiovascular:      Rate and Rhythm: Normal rate and regular rhythm.      Heart sounds: Normal heart sounds. "   Pulmonary:      Effort: Pulmonary effort is normal.   Skin:     General: Skin is warm and dry.   Neurological:      Mental Status: She is alert and oriented to person, place, and time.   Psychiatric:         Mood and Affect: Mood normal.                 Assessment and Plan     Diagnoses and all orders for this visit:    1. Chest pain, atypical (Primary)  Assessment & Plan:  April 2023 left heart cath with normal coronary arteries.  Patient does not want to do a trial of beta-blocker.  Will refer back to PCP patient can return to clinic as needed or if anything changes.      2. SOB (shortness of breath)  Assessment & Plan:  Normal heart cath and overall benign echo.  Dr. Linton is ordering PFTs for patient.  Will refer back to PCP patient to return to clinic as needed        Recommendations: ER if symptoms increase, Report if any new/changing symptoms immediately and Limitations of stress testing for definitive diagnosis reviewed      Follow Up  Return for PRN.  Patient was given instructions and counseling regarding her condition or for health maintenance advice. Please see specific information pulled into the AVS if appropriate.

## 2023-05-25 NOTE — ASSESSMENT & PLAN NOTE
April 2023 left heart cath with normal coronary arteries.  Patient does not want to do a trial of beta-blocker.  Will refer back to PCP patient can return to clinic as needed or if anything changes.

## 2023-05-25 NOTE — ASSESSMENT & PLAN NOTE
Normal heart cath and overall benign echo.  Dr. Linton is ordering PFTs for patient.  Will refer back to PCP patient to return to clinic as needed

## 2023-05-30 ENCOUNTER — HOSPITAL ENCOUNTER (OUTPATIENT)
Dept: GENERAL RADIOLOGY | Facility: HOSPITAL | Age: 73
Discharge: HOME | End: 2023-05-30
Admitting: PHYSICIAN ASSISTANT

## 2023-05-30 DIAGNOSIS — M47.812 CERVICAL SPONDYLOSIS WITHOUT MYELOPATHY: ICD-10-CM

## 2023-05-30 DIAGNOSIS — Z96.89 SPINAL CORD STIMULATOR STATUS: ICD-10-CM

## 2023-05-30 PROCEDURE — 72100 X-RAY EXAM L-S SPINE 2/3 VWS: CPT

## 2023-05-30 PROCEDURE — 72070 X-RAY EXAM THORAC SPINE 2VWS: CPT

## 2023-06-05 PROCEDURE — 94010 BREATHING CAPACITY TEST: CPT | Performed by: INTERNAL MEDICINE

## 2023-06-06 ENCOUNTER — OUTSIDE FACILITY SERVICE (OUTPATIENT)
Dept: CARDIOLOGY | Facility: CLINIC | Age: 73
End: 2023-06-06
Payer: MEDICARE

## 2023-06-09 ENCOUNTER — TELEPHONE (OUTPATIENT)
Dept: NEUROSURGERY | Facility: CLINIC | Age: 73
End: 2023-06-09
Payer: MEDICARE

## 2023-06-09 NOTE — TELEPHONE ENCOUNTER
Dr Arriola does not feel comfortable removing patient's spinal cord stimulator leads after reviewing her studies. I notified patient and she was very understanding.

## 2024-08-06 NOTE — Clinical Note
IR Board and Ayla Desk informed pt in MPU #6 for consents / pt arrived to U #6 @ 11:45 via transport with Blood Unit infusing via L side UE IV / patent dressing intact    The right DP pulse is +1. The right PT pulse is +1. The right radial pulse is +2.

## 2025-03-03 ENCOUNTER — HOSPITAL ENCOUNTER (EMERGENCY)
Age: 75
Discharge: HOME | End: 2025-03-03
Payer: MEDICARE

## 2025-03-03 VITALS
SYSTOLIC BLOOD PRESSURE: 143 MMHG | TEMPERATURE: 98.6 F | OXYGEN SATURATION: 98 % | DIASTOLIC BLOOD PRESSURE: 77 MMHG | HEART RATE: 83 BPM | RESPIRATION RATE: 18 BRPM

## 2025-03-03 VITALS — HEART RATE: 99 BPM | OXYGEN SATURATION: 96 % | DIASTOLIC BLOOD PRESSURE: 81 MMHG | SYSTOLIC BLOOD PRESSURE: 142 MMHG

## 2025-03-03 VITALS
SYSTOLIC BLOOD PRESSURE: 149 MMHG | HEART RATE: 105 BPM | RESPIRATION RATE: 20 BRPM | TEMPERATURE: 98.6 F | DIASTOLIC BLOOD PRESSURE: 89 MMHG | OXYGEN SATURATION: 96 %

## 2025-03-03 VITALS — OXYGEN SATURATION: 98 % | HEART RATE: 95 BPM | DIASTOLIC BLOOD PRESSURE: 63 MMHG | SYSTOLIC BLOOD PRESSURE: 122 MMHG

## 2025-03-03 VITALS — HEART RATE: 95 BPM | OXYGEN SATURATION: 95 % | SYSTOLIC BLOOD PRESSURE: 142 MMHG | DIASTOLIC BLOOD PRESSURE: 81 MMHG

## 2025-03-03 VITALS — BODY MASS INDEX: 25.7 KG/M2

## 2025-03-03 DIAGNOSIS — R11.2: Primary | ICD-10-CM

## 2025-03-03 DIAGNOSIS — R19.7: ICD-10-CM

## 2025-03-03 DIAGNOSIS — R10.10: ICD-10-CM

## 2025-03-03 LAB
ALBUMIN LEVEL: 4.7 G/DL (ref 3.5–5)
ALBUMIN/GLOB SERPL: 1.4 {RATIO} (ref 1.1–1.8)
ALP ISO SERPL-ACNC: 120 U/L (ref 38–126)
ALT SERPLBLD-CCNC: 21 U/L (ref 12–78)
ANION GAP SERPL CALC-SCNC: 13.1 MEQ/L (ref 5–15)
AST SERPL QL: 29 U/L (ref 14–36)
BILIRUBIN,TOTAL: 0.4 MG/DL (ref 0.2–1.3)
BUN SERPL-MCNC: 15 MG/DL (ref 7–17)
CALCIUM SPEC-MCNC: 9.4 MG/DL (ref 8.4–10.2)
CELLS COUNTED: 100
CHLORIDE SPEC-SCNC: 110 MMOL/L (ref 98–107)
CO2 SERPL-SCNC: 23 MMOL/L (ref 22–30)
CREAT BLD-SCNC: 0.9 MG/DL (ref 0.52–1.04)
CREATININE CLEARANCE ESTIMATED: 56 ML/MIN (ref 50–200)
ESTIMATED GLOMERULAR FILT RATE: 62 ML/MIN (ref 60–?)
GFR (AFRICAN AMERICAN): 74 ML/MIN (ref 60–?)
GLOBULIN SER CALC-MCNC: 3.4 G/DL (ref 1.3–3.2)
GLUCOSE: 186 MG/DL (ref 74–100)
HCT VFR BLD CALC: 41.6 % (ref 37–47)
HGB BLD-MCNC: 13.4 G/DL (ref 12.2–16.2)
LIPASE: 132 U/L (ref 23–300)
MANUAL DIFFERENTIAL: (no result)
MCHC RBC-ENTMCNC: 32.2 G/DL (ref 31.8–35.4)
MCV RBC: 87.9 FL (ref 81–99)
MEAN CORPUSCULAR HEMOGLOBIN: 28.3 PG (ref 27–31.2)
PLATELET # BLD: 258 K/MM3 (ref 142–424)
POTASSIUM: 4.1 MMOL/L (ref 3.5–5.1)
PROT SERPL-MCNC: 8.1 G/DL (ref 6.3–8.2)
RBC # BLD AUTO: 4.73 M/MM3 (ref 4.2–5.4)
SODIUM SPEC-SCNC: 142 MMOL/L (ref 136–145)
WBC # BLD AUTO: 10.6 K/MM3 (ref 4.8–10.8)

## 2025-03-03 PROCEDURE — 96374 THER/PROPH/DIAG INJ IV PUSH: CPT

## 2025-03-03 PROCEDURE — 99285 EMERGENCY DEPT VISIT HI MDM: CPT

## 2025-03-03 PROCEDURE — 96375 TX/PRO/DX INJ NEW DRUG ADDON: CPT

## 2025-03-03 PROCEDURE — 85025 COMPLETE CBC W/AUTO DIFF WBC: CPT

## 2025-03-03 PROCEDURE — 83605 ASSAY OF LACTIC ACID: CPT

## 2025-03-03 PROCEDURE — 83690 ASSAY OF LIPASE: CPT

## 2025-03-03 PROCEDURE — 80053 COMPREHEN METABOLIC PANEL: CPT

## 2025-03-03 PROCEDURE — 85027 COMPLETE CBC AUTOMATED: CPT

## 2025-03-03 PROCEDURE — 85007 BL SMEAR W/DIFF WBC COUNT: CPT

## 2025-03-03 PROCEDURE — 96361 HYDRATE IV INFUSION ADD-ON: CPT

## 2025-03-03 PROCEDURE — 74177 CT ABD & PELVIS W/CONTRAST: CPT

## 2025-03-03 NOTE — HMH.EDGENADL
Discharge Plan
Disposition
Patient Disposition: Home, Self-Care

Prescriptions
Prescriptions:
New
  dicyclomine 20 mg tablet 
   20 mg PO TID PRN (Reason: abdominal pain or spasm) 7 Days Qty: 21 0RF

Referrals
Follow up/Referrals:
Beto Goodman [Primary Care Provider] - See instructions

Activity Restrictions/Add. Instructions
Additional Instructions/Restrictions:
No emergent medical condition identified today your symptoms are most likely continuation or an acute exacerbation of your chronic IBS?D.  I do recommend that you follow-up with your gastroenterologist in the meantime we have prescribed you 
dicyclomine/Bentyl which may help with your spasmodic discomfort.  Keep yourself well-hydrated.  Also as noted on your CT scan there is some slight thickening of your gastric/stomach wall which you are aware of but this needs to be followed closely 
by your gastroenterologist.

Clinical Impressions
Clinical Impression:
 Nausea vomiting and diarrhea


Instructions
Patient Instructions:  DI for Acute Abdominal Pain

Print Language
Print Language: English

Discharge
ED Provider: ARABELLA Severino


General Adult HPI
General
Chief complaint: Abdominal Pain
Stated complaint: abd pain diarrhea vomiting
Time Seen by Provider: 03/03/25 08:44
Mode of Arrival: Ambulatory
Source of Information: Patient and Spouse
Description of Symptoms (Recalled from ER Triage Doc. by RN): pt is here today for upper abd pain that started last night followed by diarrhea which is not abnormal for her bc she has ibs however it was then followed by vomiting which is abnormal.
History of Present Illness
HPI narrative: 
Patient is a 74-year-old female with a history of IBS?D who presents today with acute worsening of her abdominal pain and nausea vomiting and diarrhea.  Pain woke her up around 1:30 AM.  Located in mid epigastric region and diffusely across her 
lower abdomen.  No blood in her vomit or in her stool.  No melena from historical standpoint.  She has been recently on budesonide.  She is followed by gastroenterologist at [facility].  She has recently had colonoscopies and endoscopies which 
initially did show some ulceration she is unsure of the exact location but repeat scope showed that those had healed.  She has not had any imaging of her abdomen in several years.  No fevers chills etc.
Related Data
Previous Rx's

?Medication ?Instructions ?Recorded
dicyclomine 20 mg tablet 20 mg PO TID PRN abdominal pain or 03/03/25
 spasm 7 days #21 tabs 


Allergies

Allergy/AdvReac Type Severity Reaction Status Date / Time
erythromycin base Allergy  Rash Verified 03/03/25 09:13
sumatriptan (From Imitrex) Allergy  Nausea Verified 03/03/25 09:12



[facility]
Disclaimer: 
The information contained in this section may have been updated after the patient was seen, as this information can be updated by other users.

Social History (Reviewed 03/03/25 @ 09:12 by Jose Manuel Geller RN)
Smoking Status:  Unknown if ever smoked 
alcohol intake:  never 
current occupational status:  other 
Travel in the last 8 weeks:  None 



ROS Obtained: Yes All systems reviewed & no additional complaints except as documented

Physical Exam
General
General appearance: alert and in no apparent distress
Respiratory
Respiratory exam: Present normal lung sounds bilaterally
Cardiovascular
Cardiovascular exam: Present regular rate
Abdominal Exam
Abdominal exam: Present other (Patient has diffuse lower abdominal tenderness no rebound or guarding no masses felt no significant distention noted)
Neurological Exam
Neurological exam: Present alert and oriented X3

Medical Decision Making
Medical Records
Screening: 
Per USPSTF and CDC recommendations, given the prevalence of disease in our region, it is our hospital?s policy to screen for HIV and viral Hepatitis for all patients aged 18 and over and those with ongoing risk factors. 

[place] Inquiry
Pt receiving controlled substance: No
Vital Signs: 



 03/03/25
08:11 03/03/25
08:30 03/03/25
09:30
Temperature 98.6 F  
Temperature Source Oral  
Pulse Rate  99 H 95 H
Pulse Rate [Left Radial] 105 H  
Respiratory Rate 20  
Blood Pressure  142/81 H 142/81 H
Blood Pressure [Right Arm] 149/89 H  
Blood Pressure Mean  105 
Blood Pressure Mean [Right Arm] 109  
02 Sat by Pulse Oximetry 96 96 95
Oxygen Delivery Method Room Air  

 03/03/25
10:25
Temperature 
Temperature Source 
Pulse Rate 95 H
Pulse Rate [Left Radial] 
Respiratory Rate 
Blood Pressure 122/63
Blood Pressure [Right Arm] 
Blood Pressure Mean 82
Blood Pressure Mean [Right Arm] 
02 Sat by Pulse Oximetry 98
Oxygen Delivery Method 



Lab Data
Lab results reviewed: Yes I reviewed the patient's lab results.

Lab Results

03/03/25 09:00: WBC 10.6, RBC 4.73, Hgb 13.4, Hct 41.6, MCV 87.9, MCH 28.3, MCHC 32.2, RDW 13.2, Plt Count 258, MPV 9.7, Neut % (Auto) 94.7 H, Lymph % (Auto) 2.8 L, Mono % (Auto) 1.9, Eos % (Auto) 0.3, Baso % (Auto) 0.1, Neut # (Auto) 10.0 H, Lymph 
# (Auto) 0.3 L, Mono # (Auto) 0.2, Eos # (Auto) 0.0, Baso # (Auto) 0.0, Total Counted 100, Neutrophils % (Manual) 84 H, Band Neutrophils % 9.0 H, Lymphocytes % (Manual) 4 L, Monocytes % (Manual) 2, Eosinophils % (Manual) 1, Platelet Estimate Normal, 
RBC Morphology Normal, Sodium 142, Potassium 4.1, Chloride 110 H, Carbon Dioxide 23, Anion Gap 13.1, BUN 15, Creatinine 0.90, Estimated Creat Clear 56, Estimated GFR 62, Est GFR (African Amer) 74, Glucose 186 H, Lactate 1.3, Calcium 9.4, Total 
Bilirubin 0.4, AST 29, ALT 21, Alkaline Phosphatase 120, Total Protein 8.1, Albumin 4.7, Globulin 3.4 H, Albumin/Globulin Ratio 1.4, Lipase 132




03/03/25 09:00          

03/03/25 09:00          

Orders (Tests/Meds): 

ED MEDICATIONS


Discontinued Medications

Generic Name Dose Route Start Last Admin
  Trade Name Yefriq  PRN Reason Stop Dose Admin
Lactated Ringer's  1,000 mls @ 999 mls/hr  03/03/25 09:00  03/03/25 09:19
  Lactated Ringer's 1000 Ml Bag  IV  03/03/25 10:00  999 mls/hr
  .[id number] LIZ   Administration
Iopamidol  75 ml  03/03/25 09:45  03/03/25 09:46
  Iopamidol-370 (76%);100ml Bottle  IV  03/03/25 09:46  75 ml
  ONCE ONE   Administration
Morphine Sulfate  4 mg  03/03/25 08:54  03/03/25 09:18
  Morphine 4mg/Ml Syringe  IV  03/03/25 08:55  4 mg
  ONCE ONE   Administration
Ondansetron HCl  4 mg  03/03/25 08:54  03/03/25 09:19
  Ondansetron 4mg/2ml Vial  IV  03/03/25 08:55  4 mg
  ONCE ONE   Administration
Promethazine HCl  12.5 mg  03/03/25 10:55  03/03/25 10:59
  Promethazine Hcl 25mg/Ml 1ml Vial  IV  03/03/25 10:56  12.5 mg
  ONCE ONE   Administration
Sodium Chloride  10 ml  03/03/25 09:45  03/03/25 09:46
  Sodium Chloride 0.9% 10ml Syr (Rad Only)  IV  03/03/25 09:46  10 ml
  ONCE ONE   Administration
Sodium Chloride  25 ml  03/03/25 10:55  03/03/25 10:59
  Sodium Chloride 0.9% 25ml Bag  IV  03/03/25 10:56  25 ml
  ONCE ONE   Administration

ORDERS

 Category Date Time Status
 CT abdomen pelvis w con Stat Cat Scan  03/03/25 08:54 Completed
 CBC w/Auto Diff [Complete Blood Count Auto Diff] Stat Lab  03/03/25 09:00 Completed
 CMP [Comprehensive Metabolic Panel] Stat Lab  03/03/25 09:00 Completed
 Lactic Acid Stat Lab  03/03/25 09:00 Completed
 Lipase Stat Lab  03/03/25 09:00 Completed



Medical Decision Narrative: 
Patient with a history of chronic abdominal pain and IBS?D presents today with worsening of her abdominal pain nausea vomiting diarrhea.  Differential includes an acute exacerbation of her known chronic illness, malignancy, diverticulitis, colitis, 
gastroenteritis, etc.  Will get a contrasted CT scan for further evaluation and management IV fluids pain medicine nausea medicine have been administered will reassess.

Reassessment 1130 serial exams are benign however patient still has some nausea.  She was given Phenergan as well.  CT scan was performed which I personally interpreted which shows no evidence of an acute abnormality she does have some chronic 
gastric wall thickening she has had multiple endoscopies recently she is aware this will follow-up with her gastroenterologist.  I discussed with her multiple therapeutic options we decided to go with some Bentyl today.  She will follow back up with 
her doctor and was discharged in stable condition.

Critical Care
Critical Care Time
Critical Care Time: No

## 2025-03-03 NOTE — CT_ITS
FINAL REPORT 
 
TECHNIQUE: 
Oral and IV contrast enhanced exam.  Coronal and sagittal images 
were obtained and reviewed.  This study was performed with 
techniques to keep radiation doses as low as reasonably 
achievable, (ALARA). Individualized dose reduction techniques 
using automated exposure control or adjustment of mA and/or kV 
according to the patient''s size were employed. 
 
CLINICAL HISTORY: 
diffuse lower abd pain, n/v/d 
 
COMPARISON: 
None 
 
FINDINGS: 
Abdomen: No acute density is seen within the lung bases.  Upper 
pole left renal cyst measures 31 mm.  The remaining solid organs 
are normal.  The patient is status post cholecystectomy.  There 
is no evidence of biliary ductal dilatation.  There is moderate 
gastric distention with fluid.  Mild wall thickening of the 
gastric antrum raises the question of gastritis or even peptic 
ulcer disease.  Consider follow-up EGD for further evaluation. 
A component of gastric outlet syndrome is not excluded.  The 
small bowel is unremarkable.  There is no adenopathy.  Pelvis: 
The appendix is not seen, possibly removed at time of 
hysterectomy.  There is a benign-appearing cystic lesion in the 
posterior pelvis, likely ovarian in origin, measuring 15 mm. 
The right ovary is normal.  There is mild pelvic floor prolapse. 
There is no free fluid. 
 
IMPRESSION: 
Abnormal wall thickening of the gastric antrum with distention 
could represent gastritis or peptic ulcer disease.  Consider 
follow-up EGD. 
 
Reviewed, Interpreted and Dictated by MITUL Rutherford MD 
Transcribed by Emmie Pierson 
 
Authenticated and Electronically Signed by MITUL Rutherford MD on 
03/03/2025 10:13:04 AM [facility]

## 2025-03-03 NOTE — ED_ITS
Discharge Plan    
Disposition    
Patient Disposition: Home, Self-Care    
    
Prescriptions    
Prescriptions:    
New    
  dicyclomine 20 mg tablet     
   20 mg PO TID PRN (Reason: abdominal pain or spasm) 7 Days Qty: 21 0RF    
    
Referrals    
Follow up/Referrals:    
Beto Goodman [Primary Care Provider] - See instructions    
    
Activity Restrictions/Add. Instructions    
Additional Instructions/Restrictions:    
No emergent medical condition identified today your symptoms are most likely   
continuation or an acute exacerbation of your chronic IBS?D.  I do recommend   
that you follow-up with your gastroenterologist in the meantime we have   
prescribed you dicyclomine/Bentyl which may help with your spasmodic discomfort.  
 Keep yourself well-hydrated.  Also as noted on your CT scan there is some   
slight thickening of your gastric/stomach wall which you are aware of but this   
needs to be followed closely by your gastroenterologist.    
    
Clinical Impressions    
Clinical Impression:    
 Nausea vomiting and diarrhea    
    
    
Instructions    
Patient Instructions:  DI for Acute Abdominal Pain    
    
Print Language    
Print Language: English    
    
Discharge    
ED Provider: ARABELLA Severino    
    
    
General Adult HPI    
General    
Chief complaint: Abdominal Pain    
Stated complaint: abd pain diarrhea vomiting    
Time Seen by Provider: 03/03/25 08:44    
Mode of Arrival: Ambulatory    
Source of Information: Patient and Spouse    
Description of Symptoms (Recalled from ER Triage Doc. by RN): pt is here today   
for upper abd pain that started last night followed by diarrhea which is not   
abnormal for her bc she has ibs however it was then followed by vomiting which   
is abnormal.    
History of Present Illness    
HPI narrative:     
Patient is a 74-year-old female with a history of IBS?D who presents today with   
acute worsening of her abdominal pain and nausea vomiting and diarrhea.  Pain   
woke her up around 1:30 AM.  Located in mid epigastric region and diffusely   
across her lower abdomen.  No blood in her vomit or in her stool.  No melena   
from historical standpoint.  She has been recently on budesonide.  She is   
followed by gastroenterologist at [facility].  She has recently had colonoscopies  
and endoscopies which initially did show some ulceration she is unsure of the   
exact location but repeat scope showed that those had healed.  She has not had   
any imaging of her abdomen in several years.  No fevers chills etc.    
Related Data    
                                  Previous Rx's    
    
    
    
?Medication ?Instructions ?Recorded    
     
dicyclomine 20 mg tablet 20 mg PO TID PRN abdominal pain or 03/03/25    
    
 spasm 7 days #21 tabs     
    
    
    
                                    Allergies    
    
    
    
Allergy/AdvReac Type Severity Reaction Status Date / Time    
     
erythromycin base Allergy  Rash Verified 03/03/25 09:13    
     
sumatriptan (From Imitrex) Allergy  Nausea Verified 03/03/25 09:12    
    
    
    
    
[facility]    
Disclaimer:     
The information contained in this section may have been updated after the   
patient was seen, as this information can be updated by other users.    
    
Social History (Reviewed 03/03/25 @ 09:12 by Jose Manuel Geller RN)    
Smoking Status:  Unknown if ever smoked     
alcohol intake:  never     
current occupational status:  other     
Travel in the last 8 weeks:  None     
    
    
    
ROS Obtained: Yes All systems reviewed & no additional complaints except as   
documented    
    
Physical Exam    
General    
General appearance: alert and in no apparent distress    
Respiratory    
Respiratory exam: Present normal lung sounds bilaterally    
Cardiovascular    
Cardiovascular exam: Present regular rate    
Abdominal Exam    
Abdominal exam: Present other (Patient has diffuse lower abdominal tenderness no  
rebound or guarding no masses felt no significant distention noted)    
Neurological Exam    
Neurological exam: Present alert and oriented X3    
    
Medical Decision Making    
Medical Records    
Screening:     
Per USPSTF and CDC recommendations, given the prevalence of disease in our   
region, it is our hospital?s policy to screen for HIV and viral Hepatitis for   
all patients aged 18 and over and those with ongoing risk factors.     
    
[place] Inquiry    
Pt receiving controlled substance: No    
Vital Signs:     
    
                                            
    
    
    
 03/03/25    
08:11 03/03/25    
08:30 03/03/25    
09:30    
     
Temperature 98.6 F      
     
Temperature Source Oral      
     
Pulse Rate  99 H 95 H    
     
Pulse Rate [Left Radial] 105 H      
     
Respiratory Rate 20      
     
Blood Pressure  142/81 H 142/81 H    
     
Blood Pressure [Right Arm] 149/89 H      
     
Blood Pressure Mean  105     
     
Blood Pressure Mean [Right Arm] 109      
     
02 Sat by Pulse Oximetry 96 96 95    
     
Oxygen Delivery Method Room Air      
    
    
    
    
 03/03/25    
10:25    
     
Temperature     
     
Temperature Source     
     
Pulse Rate 95 H    
     
Pulse Rate [Left Radial]     
     
Respiratory Rate     
     
Blood Pressure 122/63    
     
Blood Pressure [Right Arm]     
     
Blood Pressure Mean 82    
     
Blood Pressure Mean [Right Arm]     
     
02 Sat by Pulse Oximetry 98    
     
Oxygen Delivery Method     
    
    
    
    
Lab Data    
Lab results reviewed: Yes I reviewed the patient's lab results.    
    
                                   Lab Results    
    
03/03/25 09:00: WBC 10.6, RBC 4.73, Hgb 13.4, Hct 41.6, MCV 87.9, MCH 28.3, MCHC  
32.2, RDW 13.2, Plt Count 258, MPV 9.7, Neut % (Auto) 94.7 H, Lymph % (Auto) 2.8  
L, Mono % (Auto) 1.9, Eos % (Auto) 0.3, Baso % (Auto) 0.1, Neut # (Auto) 10.0 H,  
Lymph # (Auto) 0.3 L, Mono # (Auto) 0.2, Eos # (Auto) 0.0, Baso # (Auto) 0.0,   
Total Counted 100, Neutrophils % (Manual) 84 H, Band Neutrophils % 9.0 H, L  
ymphocytes % (Manual) 4 L, Monocytes % (Manual) 2, Eosinophils % (Manual) 1,   
Platelet Estimate Normal, RBC Morphology Normal, Sodium 142, Potassium 4.1, C  
hloride 110 H, Carbon Dioxide 23, Anion Gap 13.1, BUN 15, Creatinine 0.90,   
Estimated Creat Clear 56, Estimated GFR 62, Est GFR (African Amer) 74, Glucose   
186 H, Lactate 1.3, Calcium 9.4, Total Bilirubin 0.4, AST 29, ALT 21, Alkaline   
Phosphatase 120, Total Protein 8.1, Albumin 4.7, Globulin 3.4 H,   
Albumin/Globulin Ratio 1.4, Lipase 132    
    
    
    
    
                                                        03/03/25 09:00              
    
                                                        03/03/25 09:00              
    
Orders (Tests/Meds):     
    
                                 ED MEDICATIONS    
    
    
    
    
Discontinued Medications    
    
    
    
    
Generic Name Dose Route Start Last Admin    
    
  Trade Name Yefriq  PRN Reason Stop Dose Admin    
     
Lactated Ringer's  1,000 mls @ 999 mls/hr  03/03/25 09:00  03/03/25 09:19    
    
  Lactated Ringer's 1000 Ml Bag  IV  03/03/25 10:00  999 mls/hr    
    
  .Q1H1M LIZ   Administration    
     
Iopamidol  75 ml  03/03/25 09:45  03/03/25 09:46    
    
  Iopamidol-370 (76%);100ml Bottle  IV  03/03/25 09:46  75 ml    
    
  ONCE ONE   Administration    
     
Morphine Sulfate  4 mg  03/03/25 08:54  03/03/25 09:18    
    
  Morphine 4mg/Ml Syringe  IV  03/03/25 08:55  4 mg    
    
  ONCE ONE   Administration    
     
Ondansetron HCl  4 mg  03/03/25 08:54  03/03/25 09:19    
    
  Ondansetron 4mg/2ml Vial  IV  03/03/25 08:55  4 mg    
    
  ONCE ONE   Administration    
     
Promethazine HCl  12.5 mg  03/03/25 10:55  03/03/25 10:59    
    
  Promethazine Hcl 25mg/Ml 1ml Vial  IV  03/03/25 10:56  12.5 mg    
    
  ONCE ONE   Administration    
     
Sodium Chloride  10 ml  03/03/25 09:45  03/03/25 09:46    
    
  Sodium Chloride 0.9% 10ml Syr (Rad Only)  IV  03/03/25 09:46  10 ml    
    
  ONCE ONE   Administration    
     
Sodium Chloride  25 ml  03/03/25 10:55  03/03/25 10:59    
    
  Sodium Chloride 0.9% 25ml Bag  IV  03/03/25 10:56  25 ml    
    
  ONCE ONE   Administration    
    
    
                                     ORDERS    
    
    
    
 Category Date Time Status    
     
 CT abdomen pelvis w con Stat Cat Scan  03/03/25 08:54 Completed    
     
 CBC w/Auto Diff [Complete Blood Count Auto Diff] Stat Lab  03/03/25 09:00   
Completed    
     
 CMP [Comprehensive Metabolic Panel] Stat Lab  03/03/25 09:00 Completed    
     
 Lactic Acid Stat Lab  03/03/25 09:00 Completed    
     
 Lipase Stat Lab  03/03/25 09:00 Completed    
    
    
    
    
Medical Decision Narrative:     
Patient with a history of chronic abdominal pain and IBS?D presents today with   
worsening of her abdominal pain nausea vomiting diarrhea.  Differential includes  
an acute exacerbation of her known chronic illness, malignancy, diverticulitis,   
colitis, gastroenteritis, etc.  Will get a contrasted CT scan for further   
evaluation and management IV fluids pain medicine nausea medicine have been   
administered will reassess.    
    
Reassessment 1130 serial exams are benign however patient still has some nausea.  
 She was given Phenergan as well.  CT scan was performed which I personally   
interpreted which shows no evidence of an acute abnormality she does have some   
chronic gastric wall thickening she has had multiple endoscopies recently she is  
aware this will follow-up with her gastroenterologist.  I discussed with her   
multiple therapeutic options we decided to go with some Bentyl today.  She will   
follow back up with her doctor and was discharged in stable condition.    
    
Critical Care    
Critical Care Time    
Critical Care Time: No

## 2025-08-20 ENCOUNTER — TRANSCRIBE ORDERS (OUTPATIENT)
Dept: ADMINISTRATIVE | Facility: HOSPITAL | Age: 75
End: 2025-08-20
Payer: MEDICARE

## 2025-08-20 DIAGNOSIS — Z12.31 ENCOUNTER FOR SCREENING MAMMOGRAM FOR MALIGNANT NEOPLASM OF BREAST: Primary | ICD-10-CM

## (undated) DEVICE — DRILL BIT 7080513 STERILE 13MM

## (undated) DEVICE — INSTRUMENT 875-088 14MM DSTRCT PIN STRL: Brand: MEDTRONIC REUSABLE INSTRUMENT

## (undated) DEVICE — CATH DIAG EXPO M/ PK 6FR FL4/FR4 PIG 3PK

## (undated) DEVICE — PK NEURO DISC 10

## (undated) DEVICE — Device

## (undated) DEVICE — GW J TP FIX CORE .035 150

## (undated) DEVICE — SNAP KOVER: Brand: UNBRANDED

## (undated) DEVICE — PENCL ROCKRSWCH MEGADYNE W/HOLSTR 10FT SS

## (undated) DEVICE — PK CATH CARD 10

## (undated) DEVICE — BLANKT WARM LOWR/BDY 100X120CM

## (undated) DEVICE — KITTNER SPONGE: Brand: DEROYAL

## (undated) DEVICE — 3M™ STERI-DRAPE™ INSTRUMENT POUCH 1018: Brand: STERI-DRAPE™

## (undated) DEVICE — GLIDESHEATH BASIC HYDROPHILIC COATED INTRODUCER SHEATH: Brand: GLIDESHEATH

## (undated) DEVICE — GLV SURG PREMIERPRO MIC LTX PF SZ6.5 BRN

## (undated) DEVICE — PINNACLE INTRODUCER SHEATH: Brand: PINNACLE

## (undated) DEVICE — PATIENT RETURN ELECTRODE, SINGLE-USE, CONTACT QUALITY MONITORING, ADULT, WITH 9FT CORD, FOR PATIENTS WEIGING OVER 33LBS. (15KG): Brand: MEGADYNE

## (undated) DEVICE — MODEL BT2000 P/N 700287-012KIT CONTENTS: MANIFOLD WITH SALINE AND CONTRAST PORTS, SALINE TUBING WITH SPIKE AND HAND SYRINGE, TRANSDUCER: Brand: BT2000 AUTOMATED MANIFOLD KIT

## (undated) DEVICE — GLV SURG PREMIERPRO MIC LTX PF SZ7.5 BRN

## (undated) DEVICE — INSTRUMENT 7080902 PLATE HOLDING PIN

## (undated) DEVICE — BANDAGE,GAUZE,BULKEE II,4.5"X4.1YD,STRL: Brand: MEDLINE

## (undated) DEVICE — MODEL AT P65, P/N 701554-001KIT CONTENTS: HAND CONTROLLER, 3-WAY HIGH-PRESSURE STOPCOCK WITH ROTATING END AND PREMIUM HIGH-PRESSURE TUBING: Brand: ANGIOTOUCH® KIT

## (undated) DEVICE — STRAP POSTN KN/BDY FM 5X72IN DISP

## (undated) DEVICE — SKIN PREP TRAY W/CHG: Brand: MEDLINE INDUSTRIES, INC.

## (undated) DEVICE — ADULT, W/LG. BACK PAD, RADIOTRANSPARENT ELEMENT AND LEAD WIRE: Brand: DEFIBRILLATION ELECTRODES

## (undated) DEVICE — SUT VIC PLS CTD ANTIB BR 3/0 8/18IN 45CM

## (undated) DEVICE — CATH DIAG EXPO .056 FL3.5 6F 100CM

## (undated) DEVICE — ADHS SKIN PREMIERPRO EXOFIN TOPICAL HI/VISC .5ML

## (undated) DEVICE — TOOL MR8-12BA30 MR8 12CM BALL 3MM DIAM: Brand: MIDAS REX MR8

## (undated) DEVICE — ANTIBACTERIAL UNDYED BRAIDED (POLYGLACTIN 910), SYNTHETIC ABSORBABLE SUTURE: Brand: COATED VICRYL

## (undated) DEVICE — DEV COMP RAD PRELUDESYNC 24CM

## (undated) DEVICE — GW PERIPH GUIDERIGHT STD/EXCHNG/J/TIP SS 0.035IN 5X260CM

## (undated) DEVICE — NEEDLE, QUINCKE, 20GX3.5": Brand: MEDLINE

## (undated) DEVICE — ANGIO-SEAL VIP VASCULAR CLOSURE DEVICE: Brand: ANGIO-SEAL

## (undated) DEVICE — SUT SILK 2/0 TIES 18IN A185H

## (undated) DEVICE — ELECTRD BLD EZ CLN MOD XLNG 2.75IN

## (undated) DEVICE — DRP MICROSCOPE 4 BINOCULAR CV 54X150IN

## (undated) DEVICE — BLD KNIF KARLIN 46 3178